# Patient Record
Sex: FEMALE | Race: BLACK OR AFRICAN AMERICAN | Employment: FULL TIME | URBAN - METROPOLITAN AREA
[De-identification: names, ages, dates, MRNs, and addresses within clinical notes are randomized per-mention and may not be internally consistent; named-entity substitution may affect disease eponyms.]

---

## 2017-02-28 ENCOUNTER — OFFICE VISIT (OUTPATIENT)
Dept: INTERNAL MEDICINE CLINIC | Age: 67
End: 2017-02-28

## 2017-02-28 VITALS
HEART RATE: 70 BPM | DIASTOLIC BLOOD PRESSURE: 81 MMHG | BODY MASS INDEX: 33.66 KG/M2 | HEIGHT: 63 IN | SYSTOLIC BLOOD PRESSURE: 138 MMHG | RESPIRATION RATE: 20 BRPM | WEIGHT: 190 LBS | TEMPERATURE: 97.8 F | OXYGEN SATURATION: 98 %

## 2017-02-28 DIAGNOSIS — I10 ESSENTIAL HYPERTENSION: ICD-10-CM

## 2017-02-28 DIAGNOSIS — E11.9 CONTROLLED TYPE 2 DIABETES MELLITUS WITHOUT COMPLICATION, WITHOUT LONG-TERM CURRENT USE OF INSULIN (HCC): ICD-10-CM

## 2017-02-28 DIAGNOSIS — Z00.00 MEDICARE ANNUAL WELLNESS VISIT, SUBSEQUENT: Primary | ICD-10-CM

## 2017-02-28 DIAGNOSIS — Z00.00 ROUTINE GENERAL MEDICAL EXAMINATION AT A HEALTH CARE FACILITY: ICD-10-CM

## 2017-02-28 DIAGNOSIS — E78.2 MIXED HYPERLIPIDEMIA: ICD-10-CM

## 2017-02-28 DIAGNOSIS — Z12.31 SCREENING MAMMOGRAM, ENCOUNTER FOR: ICD-10-CM

## 2017-02-28 DIAGNOSIS — Z13.39 SCREENING FOR ALCOHOLISM: ICD-10-CM

## 2017-02-28 NOTE — PROGRESS NOTES
This is a Subsequent Medicare Annual Wellness Visit providing Personalized Prevention Plan Services (PPPS) (Performed 12 months after initial AWV and PPPS )    I have reviewed the patient's medical history in detail and updated the computerized patient record. History     Past Medical History:   Diagnosis Date    Diabetes ([address]     Hypercholesterolemia     Hypertension       Past Surgical History:   Procedure Laterality Date    HX GYN      ectopic pregnancy x 2    HX GYN      ovarian cyst    HX TOTAL ABDOMINAL HYSTERECTOMY  1/1/1980    early 80's     Current Outpatient Prescriptions   Medication Sig Dispense Refill    sertraline (ZOLOFT) 25 mg tablet TAKE ONE TABLET BY MOUTH ONCE DAILY 30 Tab 5    metFORMIN (GLUCOPHAGE) 1,000 mg tablet Take 1 Tab by mouth two (2) times daily (with meals). Indications: type 2 diabetes mellitus 180 Tab 1    losartan (COZAAR) 50 mg tablet TAKE ONE TABLET BY MOUTH ONCE DAILY 30 Tab 5    hydroCHLOROthiazide (HYDRODIURIL) 25 mg tablet TAKE ONE TABLET BY MOUTH ONCE DAILY *NEEDS OFFICE VISIT FOR BLOOD PRESSURE CHECK* 30 Tab 5    atorvastatin (LIPITOR) 10 mg tablet TAKE ONE TABLET BY MOUTH ONCE DAILY 30 Tab 5    Blood-Glucose Meter monitoring kit Use as directed. 1 Kit 0    ondansetron hcl (ZOFRAN) 4 mg tablet       fluticasone (FLONASE) 50 mcg/actuation nasal spray 2 sprays by Both Nostrils route daily. 1 Bottle 5    mometasone (NASONEX) 50 mcg/actuation nasal spray 2 sprays by Both Nostrils route daily. 2 Container 0    calcium carbonate (TUMS) 200 mg calcium (500 mg) chew Take 1 Tab by mouth daily. Allergies   Allergen Reactions    Paxil [Paroxetine Hcl] Other (comments)     Made patient feel jittery.       Family History   Problem Relation Age of Onset    Diabetes Mother     Hypertension Mother     Cancer Brother     Cancer Other      Social History   Substance Use Topics    Smoking status: Former Smoker     Packs/day: 0.25     Quit date: 7/1/2014   [facility] Smokeless tobacco: Never Used    Alcohol use 0.5 oz/week     1 Cans of beer per week      Comment: once weekly     Patient Active Problem List   Diagnosis Code    GERD (gastroesophageal reflux disease) K21.9    Essential hypertension I10    Mixed hyperlipidemia E78.2    Diabetes mellitus type 2, controlled ([facility] [address] E11.9       Depression Risk Factor Screening:     PHQ 2 / 9, over the last two weeks 2/28/2017   Little interest or pleasure in doing things Not at all   Feeling down, depressed or hopeless Not at all   Total Score PHQ 2 0     Alcohol Risk Factor Screening: On any occasion during the past 3 months, have you had more than 3 drinks containing alcohol? No    Do you average more than 7 drinks per week? No      Functional Ability and Level of Safety:     Hearing Loss   normal    Activities of Daily Living   Self-care. Requires assistance with: no ADLs    Fall Risk     Fall Risk Assessment, last 12 mths 2/28/2017   Able to walk? Yes   Fall in past 12 months? No     Abuse Screen   Patient is not abused    Review of Systems   Pertinent items are noted in HPI. Physical Examination     Evaluation of Cognitive Function:  Mood/affect:  Happy most of the time  Appearance: age appropriate and well dressed  Family member/caregiver input: here alone today      Visit Vitals    /81    Pulse 70    Temp 97.8 °F (36.6 °C) (Oral)    Resp 20    Ht 5' 3\" (1.6 m)    Wt 190 lb (86.2 kg)    SpO2 98%    BMI 33.66 kg/m2     General:  Alert, cooperative, no distress, appears stated age. Head:  Normocephalic, without obvious abnormality, atraumatic. Eyes:  Conjunctivae/corneas clear. PERRL, EOMs intact. Ears:  Normal TMs and external ear canals both ears. Throat: Lips, mucosa, and tongue normal. Teeth and gums normal.   Neck: Supple, symmetrical, trachea midline, no adenopathy, thyroid: no enlargement/tenderness/nodules, no carotid bruit and no JVD. Lungs:   Clear to auscultation bilaterally. Chest wall:  No tenderness or deformity. Heart:  Regular rate and rhythm, S1, S2 normal, no murmur   Breast Exam:  No tenderness, masses, or nipple abnormality. Abdomen:   Soft, non-tender. Bowel sounds normal. No masses,  No organomegaly. Extremities: Extremities normal, atraumatic, no cyanosis or edema. Pulses: 2+ and symmetric all extremities. Skin: Skin color, texture, turgor normal. No rashes or lesions. Lymph nodes: Cervical, supraclavicular, and axillary nodes normal.   Neurologic:  Normal strength, sensation and reflexes throughout. Patient Care Team:  Sergio Santos PA-C as PCP - General    Advice/Referrals/Counseling   Education and counseling provided:  patient declined to get her flu shot. Assessment/Plan   Delia Kaur was seen today for physical.    Diagnoses and all orders for this visit:    Screening mammogram, encounter for  -     [facility] MAMMO BI SCREENING INCL CAD; Future    Medicare annual wellness visit, subsequent    Controlled type 2 diabetes mellitus without complication, without long-term current use of insulin ([facility] [address]  -     Cancel: METABOLIC PANEL, COMPREHENSIVE  -     Cancel: HEMOGLOBIN A1C WITH EAG  -     HEMOGLOBIN A1C WITH EAG  -     METABOLIC PANEL, COMPREHENSIVE    Essential hypertension  -     Cancel: METABOLIC PANEL, COMPREHENSIVE  -     METABOLIC PANEL, COMPREHENSIVE    Mixed hyperlipidemia  -     Cancel: METABOLIC PANEL, COMPREHENSIVE  -     Cancel: LIPID PANEL  -     METABOLIC PANEL, COMPREHENSIVE  -     LIPID PANEL    Routine general medical examination at a health care facility    Screening for alcoholism    . Patient states she will try to get back on track with her diet and exercise. Routine labs ordered today. I will contact her with the results once available.

## 2017-02-28 NOTE — PROGRESS NOTES
Reviewed record in preparation for visit and have obtained necessary documentation. Identified pt with two pt identifiers(name and ). Health Maintenance Due   Topic    Hepatitis C Screening     MEDICARE YEARLY EXAM     Pneumococcal 65+ Low/Medium Risk (2 of 2 - PCV13)    INFLUENZA AGE 9 TO ADULT     EYE EXAM RETINAL OR DILATED Q1     HEMOGLOBIN A1C Q6M     BREAST CANCER SCRN MAMMOGRAM          No chief complaint on file. Wt Readings from Last 3 Encounters:   17 190 lb (86.2 kg)   16 169 lb (76.7 kg)   02/15/16 166 lb (75.3 kg)     Temp Readings from Last 3 Encounters:   17 97.8 °F (36.6 °C) (Oral)   16 98.1 °F (36.7 °C) (Oral)   02/15/16 98.9 °F (37.2 °C) (Oral)     BP Readings from Last 3 Encounters:   16 127/65   02/15/16 135/72   16 133/71     Pulse Readings from Last 3 Encounters:   16 68   02/15/16 72   16 83           Learning Assessment:  :     Learning Assessment 2015   PRIMARY LEARNER Patient Patient Patient   HIGHEST LEVEL OF EDUCATION - PRIMARY LEARNER  - GRADUATED HIGH SCHOOL OR GED -   BARRIERS PRIMARY LEARNER - NONE -     - NONE -   PRIMARY LANGUAGE ENGLISH ENGLISH ENGLISH    NEED - No -   LEARNER PREFERENCE PRIMARY DEMONSTRATION DEMONSTRATION DEMONSTRATION   ANSWERED BY self Patient patient   RELATIONSHIP SELF SELF SELF       Depression Screening:  :     PHQ 2 / 9, over the last two weeks 2016   Little interest or pleasure in doing things Not at all   Feeling down, depressed or hopeless Not at all   Total Score PHQ 2 0       Fall Risk Assessment:  :     Fall Risk Assessment, last 12 mths 2016   Able to walk? Yes   Fall in past 12 months? No       Abuse Screening:  :     Abuse Screening Questionnaire 2015   Do you ever feel afraid of your partner? N N   Are you in a relationship with someone who physically or mentally threatens you? N N   Is it safe for you to go home? Y Y       Coordination of Care Questionnaire:  :     1) Have you been to an emergency room, urgent care clinic since your last visit? no   Hospitalized since your last visit? no             2) Have you seen or consulted any other health care providers outside of [facility] [place] since your last visit? no  (Include any pap smears or colon screenings in this section.)    3) Do you have an Advance Directive on file? no    4) Are you interested in receiving information on Advance Directives? YES      Patient is accompanied by self I have received verbal consent from [address] to discuss any/all medical information while they are present in the room.

## 2017-02-28 NOTE — PATIENT INSTRUCTIONS
[employer]Starcom Activation    Thank you for requesting access to [employer]. Please follow the instructions below to securely access and download your online medical record. [facility] allows you to send messages to your doctor, view your test results, renew your prescriptions, schedule appointments, and more. How Do I Sign Up? 1. In your internet browser, go to [email or web address]  2. Click on the First Time User? Click Here link in the Sign In box. You will be redirect to the New Member Sign Up page. 3. Enter your UTStarcom Access Code exactly as it appears below. You will not need to use this code after youve completed the sign-up process. If you do not sign up before the expiration date, you must request a new code. UTStarcom Access Code: [email or web address]  Expires: 2017  8:18 AM (This is the date your UTStarcom access code will )    4. Enter the last four digits of your Social Security Number (xxxx) and Date of Birth (mm/dd/yyyy) as indicated and click Submit. You will be taken to the next sign-up page. 5. Create a UTStarcom ID. This will be your UTStarcom login ID and cannot be changed, so think of one that is secure and easy to remember. 6. Create a UTStarcom password. You can change your password at any time. 7. Enter your Password Reset Question and Answer. This can be used at a later time if you forget your password. 8. Enter your e-mail address. You will receive e-mail notification when new information is available in [address] 9. Click Sign Up. You can now view and download portions of your medical record. 10. Click the Download Summary menu link to download a portable copy of your medical information. Additional Information    If you have questions, please visit the Frequently Asked Questions section of the UTStarcom website at [email or web address] Remember, UTStarcom is NOT to be used for urgent needs. For medical emergencies, dial 911.

## 2017-02-28 NOTE — MR AVS SNAPSHOT
Visit Information Date & Time Provider Department Dept. Phone Encounter #  
 2/28/2017  8:30 AM Delmis Santacruz PA-C [facility] [phone] [id number] Upcoming Health Maintenance Date Due Hepatitis C Screening 1950 MEDICARE YEARLY EXAM 12/9/2014 Pneumococcal 65+ Low/Medium Risk (2 of 2 - PCV13) 1/30/2016 EYE EXAM RETINAL OR DILATED Q1 12/17/2016 HEMOGLOBIN A1C Q6M 12/22/2016 BREAST CANCER SCRN MAMMOGRAM 3/12/2017 FOOT EXAM Q1 6/21/2017 MICROALBUMIN Q1 6/22/2017 LIPID PANEL Q1 6/22/2017 GLAUCOMA SCREENING Q2Y 12/17/2017 COLONOSCOPY 6/17/2019 DTaP/Tdap/Td series (2 - Td) 3/19/2025 Allergies as of 2/28/2017  Review Complete On: 6/21/2016 By: Delmis Santacruz PA-C Severity Noted Reaction Type Reactions Paxil [Paroxetine Hcl]  09/18/2014    Other (comments) Made patient feel jittery. Current Immunizations  Reviewed on 3/26/2015 Name Date Influenza High Dose Vaccine PF 1/22/2015  2:04 PM  
 Pneumococcal Polysaccharide (PPSV-23) 1/30/2015  3:24 PM  
 TB Skin Test (PPD) Intradermal 3/25/2015  8:00 AM  
 Tdap 3/19/2015 Zoster Vaccine, Live 3/19/2015 Not reviewed this visit You Were Diagnosed With   
  
 Codes Comments Screening mammogram, encounter for    -  Primary ICD-10-CM: Z12.31 
ICD-9-CM: V76.12 Medicare annual wellness visit, subsequent     ICD-10-CM: Z00.00 ICD-9-CM: V70.0 Controlled type 2 diabetes mellitus without complication, without long-term current use of insulin ([facility]     ICD-10-CM: E11.9 ICD-9-CM: 250.00 Essential hypertension     ICD-10-CM: I10 
ICD-9-CM: 401.9 Mixed hyperlipidemia     ICD-10-CM: E78.2 ICD-9-CM: 272.2 Vitals BP  
  
  
  
  
  
 138/81 Vitals History BMI and BSA Data Body Mass Index Body Surface Area  
 33.66 kg/m 2 1.96 m 2 Preferred Pharmacy Pharmacy Name Phone [facility] [address] [place] [address] [address] [place], [address] [phone] Your Updated Medication List  
  
   
This list is accurate as of: 2/28/17  8:53 AM.  Always use your most recent med list.  
  
  
  
  
 atorvastatin 10 mg tablet Commonly known as:  LIPITOR  
TAKE ONE TABLET BY MOUTH ONCE DAILY Blood-Glucose Meter monitoring kit Use as directed. calcium carbonate 200 mg calcium (500 mg) Chew Commonly known as:  TUMS Take 1 Tab by mouth daily. fluticasone 50 mcg/actuation nasal spray Commonly known as:  FLONASE  
2 sprays by Both Nostrils route daily. hydroCHLOROthiazide 25 mg tablet Commonly known as:  HYDRODIURIL  
TAKE ONE TABLET BY MOUTH ONCE DAILY *NEEDS OFFICE VISIT FOR BLOOD PRESSURE CHECK*  
  
 losartan 50 mg tablet Commonly known as:  COZAAR  
TAKE ONE TABLET BY MOUTH ONCE DAILY  
  
 metFORMIN 1,000 mg tablet Commonly known as:  GLUCOPHAGE Take 1 Tab by mouth two (2) times daily (with meals). Indications: type 2 diabetes mellitus  
  
 mometasone 50 mcg/actuation nasal spray Commonly known as:  NASONEX  
2 sprays by Both Nostrils route daily. ondansetron hcl 4 mg tablet Commonly known as:  ZOFRAN  
  
 sertraline 25 mg tablet Commonly known as:  ZOLOFT  
TAKE ONE TABLET BY MOUTH ONCE DAILY We Performed the Following HEMOGLOBIN A1C WITH EAG [44771 CPT(R)] LIPID PANEL [96961 CPT(R)] METABOLIC PANEL, COMPREHENSIVE [37754 CPT(R)] To-Do List   
 02/28/2017 Imaging:  [facility] MAMMO BI SCREENING INCL CAD Patient Instructions OnCirc Diagnosticshart Activation Thank you for requesting access to [employer]. Please follow the instructions below to securely access and download your online medical record. Over[employer] allows you to send messages to your doctor, view your test results, renew your prescriptions, schedule appointments, and more. How Do I Sign Up? 1. In your internet browser, go to [email or web address] 
2. Click on the First Time User? Click Here link in the Sign In box. You will be redirect to the New Member Sign Up page. 3. Enter your reQall Access Code exactly as it appears below. You will not need to use this code after youve completed the sign-up process. If you do not sign up before the expiration date, you must request a new code. reQall Access Code: [email or web address] Expires: 2017  8:18 AM (This is the date your reQall access code will ) 4. Enter the last four digits of your Social Security Number (xxxx) and Date of Birth (mm/dd/yyyy) as indicated and click Submit. You will be taken to the next sign-up page. 5. Create a reQall ID. This will be your reQall login ID and cannot be changed, so think of one that is secure and easy to remember. 6. Create a reQall password. You can change your password at any time. 7. Enter your Password Reset Question and Answer. This can be used at a later time if you forget your password. 8. Enter your e-mail address. You will receive e-mail notification when new information is available in [address] 9. Click Sign Up. You can now view and download portions of your medical record. 10. Click the Download Summary menu link to download a portable copy of your medical information. Additional Information If you have questions, please visit the Frequently Asked Questions section of the reQall website at [email or web address] Remember, reQall is NOT to be used for urgent needs. For medical emergencies, dial 911. Introducing [facility]! [facility] introduces reQall patient portal. Now you can access parts of your medical record, email your doctor's office, and request medication refills online. 1. In your internet browser, go to [email or web address] 2. Click on the First Time User? Click Here link in the Sign In box.  You will see the New Member Sign Up page. 3. Enter your GeaCom Access Code exactly as it appears below. You will not need to use this code after youve completed the sign-up process. If you do not sign up before the expiration date, you must request a new code. · GeaCom Access Code: [email or web address] Expires: 5/29/2017  8:18 AM 
 
4. Enter the last four digits of your Social Security Number (xxxx) and Date of Birth (mm/dd/yyyy) as indicated and click Submit. You will be taken to the next sign-up page. 5. Create a GeaCom ID. This will be your GeaCom login ID and cannot be changed, so think of one that is secure and easy to remember. 6. Create a GeaCom password. You can change your password at any time. 7. Enter your Password Reset Question and Answer. This can be used at a later time if you forget your password. 8. Enter your e-mail address. You will receive e-mail notification when new information is available in [address] 9. Click Sign Up. You can now view and download portions of your medical record. 10. Click the Download Summary menu link to download a portable copy of your medical information. If you have questions, please visit the Frequently Asked Questions section of the GeaCom website. Remember, GeaCom is NOT to be used for urgent needs. For medical emergencies, dial 911. Now available from your iPhone and Android! Please provide this summary of care documentation to your next provider. Your primary care clinician is listed as Molly Ortiz. If you have any questions after today's visit, please call [phone].

## 2017-03-01 LAB
CREATININE, EXTERNAL: 0.68
HBA1C MFR BLD HPLC: 6.8 %
LDL-C, EXTERNAL: 100

## 2017-03-03 ENCOUNTER — TELEPHONE (OUTPATIENT)
Dept: INTERNAL MEDICINE CLINIC | Age: 67
End: 2017-03-03

## 2017-03-03 NOTE — TELEPHONE ENCOUNTER
Please let the patient know her hemoglobin A1c is 6.8 up from 6.4. cholesterol looks good kidney and liver function all good.  thanks

## 2017-03-03 NOTE — TELEPHONE ENCOUNTER
Writer contacted patient to inform of lab results per Gunnar Burch, patient verbalized understanding.

## 2017-04-04 ENCOUNTER — HOSPITAL ENCOUNTER (OUTPATIENT)
Dept: MAMMOGRAPHY | Age: 67
Discharge: HOME | End: 2017-04-04
Attending: PHYSICIAN ASSISTANT
Payer: MEDICARE

## 2017-04-04 ENCOUNTER — TELEPHONE (OUTPATIENT)
Dept: INTERNAL MEDICINE CLINIC | Age: 67
End: 2017-04-04

## 2017-04-04 DIAGNOSIS — Z12.31 SCREENING MAMMOGRAM, ENCOUNTER FOR: ICD-10-CM

## 2017-04-04 DIAGNOSIS — R92.8 ABNORMAL MAMMOGRAM: Primary | ICD-10-CM

## 2017-04-04 PROCEDURE — 77067 SCR MAMMO BI INCL CAD: CPT

## 2017-04-05 NOTE — PROGRESS NOTES
Patient has some asymmetry noted with calcifications. Will need additional views and ultrasound. These orders have been placed.

## 2017-04-05 NOTE — PROGRESS NOTES
Writer informed patient of results per Kandy Jovel, will get back to patient in regards to additional testing.

## 2017-04-05 NOTE — TELEPHONE ENCOUNTER
I received her mammogram results and it said she would need additional views and possible ultrasound. I placed the orders because I am off today. The imaging center should contact her about this and wanted to make sure the order was already done.

## 2017-04-05 NOTE — TELEPHONE ENCOUNTER
Writer contacted patient to give mammogram results and new orders per Eliseo Emerson, patient was confused about additional mammogram order, will forward to Eliseo Emerson for advise.

## 2017-04-06 NOTE — TELEPHONE ENCOUNTER
Writer contacted patient to inform of further testing needed per Haylee Bowman, patient was a little upset and her appointment wasn't until 4/17, Irina Jackson called and got another appointment for April 7, 2917, patient somewhat relieved.

## 2017-04-07 ENCOUNTER — HOSPITAL ENCOUNTER (OUTPATIENT)
Dept: MAMMOGRAPHY | Age: 67
Discharge: HOME | End: 2017-04-07
Attending: PHYSICIAN ASSISTANT
Payer: MEDICARE

## 2017-04-07 ENCOUNTER — HOSPITAL ENCOUNTER (OUTPATIENT)
Dept: ULTRASOUND IMAGING | Age: 67
Discharge: HOME | End: 2017-04-07
Attending: PHYSICIAN ASSISTANT
Payer: MEDICARE

## 2017-04-07 DIAGNOSIS — R92.8 ABNORMAL MAMMOGRAM: ICD-10-CM

## 2017-04-07 PROCEDURE — 77065 DX MAMMO INCL CAD UNI: CPT

## 2017-04-07 NOTE — PROGRESS NOTES
Confirmed calcifications noted but no definite change.  Follow up in 6 months for unilateral right breast mammogram

## 2017-04-24 ENCOUNTER — TELEPHONE (OUTPATIENT)
Dept: INTERNAL MEDICINE CLINIC | Age: 67
End: 2017-04-24

## 2017-04-24 NOTE — TELEPHONE ENCOUNTER
Writer contacted patient to remind for her to get her labs done, patient stated she has been so busy, she did forget and writer reprinted and sent to address on file for patient per her request.

## 2017-05-05 LAB
CREATININE, EXTERNAL: 0.62
HBA1C MFR BLD HPLC: 6.8 %
LDL-C, EXTERNAL: 91

## 2017-05-12 ENCOUNTER — TELEPHONE (OUTPATIENT)
Dept: INTERNAL MEDICINE CLINIC | Age: 67
End: 2017-05-12

## 2017-05-12 NOTE — TELEPHONE ENCOUNTER
Writer spoke with patient and informed her Per Bee Sears that labs looks good and cholesterol is good and diabetes is controlled. Patient is very pleased.

## 2017-05-12 NOTE — TELEPHONE ENCOUNTER
Please let patient know her labs look good.  (cholestrol is good and diabetes is controlled.) thanks

## 2017-05-26 DIAGNOSIS — E78.5 HYPERLIPIDEMIA, UNSPECIFIED HYPERLIPIDEMIA TYPE: ICD-10-CM

## 2017-05-26 DIAGNOSIS — I10 ESSENTIAL HYPERTENSION: ICD-10-CM

## 2017-05-26 RX ORDER — ATORVASTATIN CALCIUM 10 MG/1
TABLET, FILM COATED ORAL
Qty: 30 TAB | Refills: 0 | Status: SHIPPED | OUTPATIENT
Start: 2017-05-26 | End: 2017-06-23 | Stop reason: SDUPTHER

## 2017-05-26 RX ORDER — LOSARTAN POTASSIUM 50 MG/1
TABLET ORAL
Qty: 30 TAB | Refills: 0 | Status: SHIPPED | OUTPATIENT
Start: 2017-05-26 | End: 2017-06-23 | Stop reason: SDUPTHER

## 2017-05-26 RX ORDER — HYDROCHLOROTHIAZIDE 25 MG/1
TABLET ORAL
Qty: 30 TAB | Refills: 0 | Status: SHIPPED | OUTPATIENT
Start: 2017-05-26 | End: 2017-06-23 | Stop reason: SDUPTHER

## 2017-05-30 ENCOUNTER — OFFICE VISIT (OUTPATIENT)
Dept: INTERNAL MEDICINE CLINIC | Age: 67
End: 2017-05-30

## 2017-05-30 VITALS
WEIGHT: 193 LBS | HEART RATE: 55 BPM | DIASTOLIC BLOOD PRESSURE: 67 MMHG | SYSTOLIC BLOOD PRESSURE: 142 MMHG | HEIGHT: 63 IN | TEMPERATURE: 98 F | BODY MASS INDEX: 34.2 KG/M2 | OXYGEN SATURATION: 98 % | RESPIRATION RATE: 16 BRPM

## 2017-05-30 DIAGNOSIS — E08.9 DIABETES MELLITUS DUE TO UNDERLYING CONDITION, CONTROLLED, WITHOUT COMPLICATION, WITHOUT LONG-TERM CURRENT USE OF INSULIN (HCC): ICD-10-CM

## 2017-05-30 DIAGNOSIS — M25.551 RIGHT HIP PAIN: ICD-10-CM

## 2017-05-30 DIAGNOSIS — E78.2 MIXED HYPERLIPIDEMIA: Primary | ICD-10-CM

## 2017-05-30 DIAGNOSIS — Z11.59 NEED FOR HEPATITIS C SCREENING TEST: ICD-10-CM

## 2017-05-30 DIAGNOSIS — I10 ESSENTIAL HYPERTENSION: ICD-10-CM

## 2017-05-30 NOTE — PROGRESS NOTES
HISTORY OF PRESENT ILLNESS  Mague Ray is a 78 y.o. female. HPI  Patient presents to the office for follow up visit. She states she had been really focused on eating better and exercising. She states three weeks ago she \"fell off the wagon\". She has been eating salt and vinegar chips and chocolate fudge ice cream. She has been walking about 2 miles with her daughtr in law each day. She has been trying to eat 6 small meals a day. She states she has noticed since walking that she has some intermittent right hip pain and some back stiffness. She has been taking one tylenol pm if needed. Also she reports she has noticed that behind her right ear she has a bump. She reports the bump is itchy and seems to itch more when her hair touches it when sweating. She admits to messing with the area. She denies pain. Review of Systems   Musculoskeletal: Positive for joint pain. Intermittent right hip and back pain. Skin:        \"bump behind the right ear. Blood pressure 142/67, pulse (!) 55, temperature 98 °F (36.7 °C), temperature source Oral, resp. rate 16, height 5' 3\" (1.6 m), weight 193 lb (87.5 kg), SpO2 98 %. Physical Exam   Constitutional: She appears well-developed and well-nourished. Cardiovascular: Normal rate and regular rhythm. No murmur heard. Pulmonary/Chest: Effort normal and breath sounds normal.   Skin:   Right ear- posteriorly of the ear slightly raised papule. No signs of infection. Not tender to palpation.        ASSESSMENT and PLAN  Diagnoses and all orders for this visit:    Mixed hyperlipidemia  -     METABOLIC PANEL, COMPREHENSIVE  -     LIPID PANEL    Essential hypertension  -     METABOLIC PANEL, COMPREHENSIVE    Right hip pain    Diabetes mellitus due to underlying condition, controlled, without complication, without long-term current use of insulin (HCC)  -     MICROALBUMIN, UR, RAND W/ MICROALBUMIN/CREA RATIO  -     HEMOGLOBIN A1C WITH EAG    Need for hepatitis C screening test  -     HEPATITIS C AB    patient is not due labs until August. Advised her to start back with her regular exercising and watching her diet. Told her not to but the chips if she feels she can not eat them in moderation. Trial of hydrocortisone cream behind the ear. I gave her hip exercises/stretches to see if this will help the intermittent hip pain. I told her to follow up if not better.

## 2017-05-30 NOTE — MR AVS SNAPSHOT
Visit Information Date & Time Provider Department Dept. Phone Encounter #  
 5/30/2017  8:30 AM Amee Painting PA-C [facility] [phone] [id number] Upcoming Health Maintenance Date Due Hepatitis C Screening 1950 EYE EXAM RETINAL OR DILATED Q1 12/17/2016 MICROALBUMIN Q1 6/22/2017 INFLUENZA AGE 9 TO ADULT 8/1/2017 HEMOGLOBIN A1C Q6M 9/1/2017 GLAUCOMA SCREENING Q2Y 12/17/2017 MEDICARE YEARLY EXAM 3/1/2018 LIPID PANEL Q1 3/1/2018 FOOT EXAM Q1 5/30/2018 BREAST CANCER SCRN MAMMOGRAM 4/7/2019 COLONOSCOPY 6/17/2019 DTaP/Tdap/Td series (2 - Td) 3/19/2025 Allergies as of 5/30/2017  Review Complete On: 4/7/2017 By: Yani Porter Severity Noted Reaction Type Reactions Paxil [Paroxetine Hcl]  09/18/2014    Other (comments) Made patient feel jittery. Current Immunizations  Reviewed on 3/26/2015 Name Date Influenza High Dose Vaccine PF 1/22/2015  2:04 PM  
 Pneumococcal Polysaccharide (PPSV-23) 1/30/2015  3:24 PM  
 TB Skin Test (PPD) Intradermal 3/25/2015  8:00 AM  
 Tdap 3/19/2015 Zoster Vaccine, Live 3/19/2015 Not reviewed this visit You Were Diagnosed With   
  
 Codes Comments Mixed hyperlipidemia    -  Primary ICD-10-CM: E26.5 ICD-9-CM: 272.2 Essential hypertension     ICD-10-CM: I10 
ICD-9-CM: 401.9 Right hip pain     ICD-10-CM: M25.551 ICD-9-CM: 719.45 Diabetes mellitus due to underlying condition, controlled, without complication, without long-term current use of insulin ([facility]     ICD-10-CM: E08.9 ICD-9-CM: 249.00 Need for hepatitis C screening test     ICD-10-CM: Z11.59 
ICD-9-CM: V73.89 Vitals OB Status Smoking Status Hysterectomy Former Smoker Preferred Pharmacy Pharmacy Name Phone [facility] [address] [place] [address] Dr Brown, [address] [place] [address] Avenue [phone] Your Updated Medication List  
  
   
 This list is accurate as of: 5/30/17  9:03 AM.  Always use your most recent med list.  
  
  
  
  
 * atorvastatin 10 mg tablet Commonly known as:  LIPITOR  
TAKE ONE TABLET BY MOUTH ONCE DAILY  
  
 * atorvastatin 10 mg tablet Commonly known as:  LIPITOR  
TAKE ONE TABLET BY MOUTH ONCE DAILY Blood-Glucose Meter monitoring kit Use as directed. calcium carbonate 200 mg calcium (500 mg) Chew Commonly known as:  TUMS Take 1 Tab by mouth daily. fluticasone 50 mcg/actuation nasal spray Commonly known as:  FLONASE  
2 sprays by Both Nostrils route daily. * hydroCHLOROthiazide 25 mg tablet Commonly known as:  HYDRODIURIL  
TAKE ONE TABLET BY MOUTH ONCE DAILY *NEEDS OFFICE VISIT FOR BLOOD PRESSURE CHECK*  
  
 * hydroCHLOROthiazide 25 mg tablet Commonly known as:  HYDRODIURIL  
TAKE ONE TABLET BY MOUTH ONCE DAILY  
  
 * losartan 50 mg tablet Commonly known as:  COZAAR  
TAKE ONE TABLET BY MOUTH ONCE DAILY  
  
 * losartan 50 mg tablet Commonly known as:  COZAAR  
TAKE ONE TABLET BY MOUTH ONCE DAILY  
  
 metFORMIN 1,000 mg tablet Commonly known as:  GLUCOPHAGE Take 1 Tab by mouth two (2) times daily (with meals). Indications: type 2 diabetes mellitus  
  
 mometasone 50 mcg/actuation nasal spray Commonly known as:  NASONEX  
2 sprays by Both Nostrils route daily. ondansetron hcl 4 mg tablet Commonly known as:  ZOFRAN  
  
 * sertraline 25 mg tablet Commonly known as:  ZOLOFT  
TAKE ONE TABLET BY MOUTH ONCE DAILY * sertraline 25 mg tablet Commonly known as:  ZOLOFT  
TAKE ONE TABLET BY MOUTH ONCE DAILY * Notice: This list has 8 medication(s) that are the same as other medications prescribed for you. Read the directions carefully, and ask your doctor or other care provider to review them with you. We Performed the Following HEMOGLOBIN A1C WITH EAG [87480 CPT(R)] HEPATITIS C AB [66602 CPT(R)] LIPID PANEL [49548 CPT(R)] METABOLIC PANEL, COMPREHENSIVE [96119 CPT(R)] MICROALBUMIN, UR, RAND W/ MICROALBUMIN/CREA RATIO [id number] CPT(R)] Introducing [facility]! [facility] introduces [email or web address] patient portal. Now you can access parts of your medical record, email your doctor's office, and request medication refills online. 1. In your internet browser, go to [email or web address] 2. Click on the First Time User? Click Here link in the Sign In box. You will see the New Member Sign Up page. 3. Enter your [email or web address] Access Code exactly as it appears below. You will not need to use this code after youve completed the sign-up process. If you do not sign up before the expiration date, you must request a new code. · [email or web address] Access Code: [email or web address] Expires: 8/28/2017  9:02 AM 
 
4. Enter the last four digits of your Social Security Number (xxxx) and Date of Birth (mm/dd/yyyy) as indicated and click Submit. You will be taken to the next sign-up page. 5. Create a [email or web address] ID. This will be your [email or web address] login ID and cannot be changed, so think of one that is secure and easy to remember. 6. Create a [email or web address] password. You can change your password at any time. 7. Enter your Password Reset Question and Answer. This can be used at a later time if you forget your password. 8. Enter your e-mail address. You will receive e-mail notification when new information is available in [address]. 9. Click Sign Up. You can now view and download portions of your medical record. 10. Click the Download Summary menu link to download a portable copy of your medical information. If you have questions, please visit the Frequently Asked Questions section of the [email or web address] website. Remember, [email or web address] is NOT to be used for urgent needs. For medical emergencies, dial 911. Now available from your iPhone and Android! Please provide this summary of care documentation to your next provider. Your primary care clinician is listed as Molly Ortiz. If you have any questions after today's visit, please call [phone].

## 2017-06-23 DIAGNOSIS — E78.5 HYPERLIPIDEMIA, UNSPECIFIED HYPERLIPIDEMIA TYPE: ICD-10-CM

## 2017-06-23 DIAGNOSIS — I10 ESSENTIAL HYPERTENSION: ICD-10-CM

## 2017-06-23 RX ORDER — ATORVASTATIN CALCIUM 10 MG/1
TABLET, FILM COATED ORAL
Qty: 30 TAB | Refills: 0 | Status: SHIPPED | OUTPATIENT
Start: 2017-06-23 | End: 2017-07-26 | Stop reason: SDUPTHER

## 2017-06-23 RX ORDER — HYDROCHLOROTHIAZIDE 25 MG/1
TABLET ORAL
Qty: 30 TAB | Refills: 0 | Status: SHIPPED | OUTPATIENT
Start: 2017-06-23 | End: 2017-07-26 | Stop reason: SDUPTHER

## 2017-06-23 RX ORDER — LOSARTAN POTASSIUM 50 MG/1
TABLET ORAL
Qty: 30 TAB | Refills: 0 | Status: SHIPPED | OUTPATIENT
Start: 2017-06-23 | End: 2017-07-26 | Stop reason: SDUPTHER

## 2017-10-05 ENCOUNTER — HOSPITAL ENCOUNTER (OUTPATIENT)
Dept: MAMMOGRAPHY | Age: 67
Discharge: HOME | End: 2017-10-05
Attending: PHYSICIAN ASSISTANT
Payer: MEDICARE

## 2017-10-05 DIAGNOSIS — R92.8 ABNORMAL MAMMOGRAM: ICD-10-CM

## 2017-10-05 PROCEDURE — 77065 DX MAMMO INCL CAD UNI: CPT

## 2017-10-05 NOTE — PROGRESS NOTES
Benign stable findings of right breast. She should follow up in 6 months for regular scheduled mammogram

## 2017-10-24 RX ORDER — SERTRALINE HYDROCHLORIDE 25 MG/1
TABLET, FILM COATED ORAL
Qty: 30 TAB | Refills: 5 | Status: SHIPPED | OUTPATIENT
Start: 2017-10-24 | End: 2019-09-19 | Stop reason: SDUPTHER

## 2018-01-24 DIAGNOSIS — I10 ESSENTIAL HYPERTENSION: ICD-10-CM

## 2018-01-24 DIAGNOSIS — E78.5 HYPERLIPIDEMIA, UNSPECIFIED HYPERLIPIDEMIA TYPE: ICD-10-CM

## 2018-01-25 RX ORDER — ATORVASTATIN CALCIUM 10 MG/1
TABLET, FILM COATED ORAL
Qty: 30 TAB | Refills: 5 | Status: SHIPPED | OUTPATIENT
Start: 2018-01-25 | End: 2018-03-08 | Stop reason: SDUPTHER

## 2018-01-25 RX ORDER — HYDROCHLOROTHIAZIDE 25 MG/1
TABLET ORAL
Qty: 30 TAB | Refills: 5 | Status: SHIPPED | OUTPATIENT
Start: 2018-01-25 | End: 2018-03-08 | Stop reason: SDUPTHER

## 2018-01-25 RX ORDER — LOSARTAN POTASSIUM 50 MG/1
TABLET ORAL
Qty: 30 TAB | Refills: 5 | Status: SHIPPED | OUTPATIENT
Start: 2018-01-25 | End: 2018-03-08 | Stop reason: SDUPTHER

## 2018-01-29 RX ORDER — SERTRALINE HYDROCHLORIDE 25 MG/1
TABLET, FILM COATED ORAL
Qty: 30 TAB | Refills: 5 | Status: SHIPPED | OUTPATIENT
Start: 2018-01-29 | End: 2018-03-08 | Stop reason: SDUPTHER

## 2018-03-08 ENCOUNTER — OFFICE VISIT (OUTPATIENT)
Dept: INTERNAL MEDICINE CLINIC | Age: 68
End: 2018-03-08

## 2018-03-08 VITALS
SYSTOLIC BLOOD PRESSURE: 152 MMHG | OXYGEN SATURATION: 97 % | BODY MASS INDEX: 34.91 KG/M2 | DIASTOLIC BLOOD PRESSURE: 79 MMHG | HEIGHT: 63 IN | WEIGHT: 197 LBS | TEMPERATURE: 97.7 F | RESPIRATION RATE: 20 BRPM | HEART RATE: 64 BPM

## 2018-03-08 DIAGNOSIS — Z11.59 NEED FOR HEPATITIS C SCREENING TEST: ICD-10-CM

## 2018-03-08 DIAGNOSIS — I10 ESSENTIAL HYPERTENSION: ICD-10-CM

## 2018-03-08 DIAGNOSIS — E11.9 CONTROLLED TYPE 2 DIABETES MELLITUS WITHOUT COMPLICATION, WITHOUT LONG-TERM CURRENT USE OF INSULIN (HCC): ICD-10-CM

## 2018-03-08 DIAGNOSIS — J35.8 TONSIL STONE: ICD-10-CM

## 2018-03-08 DIAGNOSIS — E78.2 MIXED HYPERLIPIDEMIA: Primary | ICD-10-CM

## 2018-03-08 DIAGNOSIS — K21.9 GASTROESOPHAGEAL REFLUX DISEASE WITHOUT ESOPHAGITIS: ICD-10-CM

## 2018-03-08 NOTE — PROGRESS NOTES
Reviewed record in preparation for visit and have obtained necessary documentation. Identified pt with two pt identifiers(name and ). Health Maintenance Due   Topic    Hepatitis C Screening     EYE EXAM RETINAL OR DILATED Q1     MICROALBUMIN Q1     Influenza Age 5 to Adult     HEMOGLOBIN A1C Q6M     GLAUCOMA SCREENING Q2Y          No chief complaint on file. Wt Readings from Last 3 Encounters:   18 197 lb (89.4 kg)   17 193 lb (87.5 kg)   17 190 lb (86.2 kg)     Temp Readings from Last 3 Encounters:   18 97.7 °F (36.5 °C) (Oral)   17 98 °F (36.7 °C) (Oral)   17 97.8 °F (36.6 °C) (Oral)     BP Readings from Last 3 Encounters:   17 142/67   17 138/81   16 127/65     Pulse Readings from Last 3 Encounters:   17 (!) 55   17 70   16 68           Learning Assessment:  :     Learning Assessment 3/8/2018 2016 2015 2014   PRIMARY LEARNER Patient Patient Patient Patient   HIGHEST LEVEL OF EDUCATION - PRIMARY LEARNER  - - GRADUATED HIGH SCHOOL OR GED -   BARRIERS PRIMARY LEARNER - - NONE -     - - NONE -   PRIMARY LANGUAGE ENGLISH ENGLISH ENGLISH ENGLISH    NEED - - No -   LEARNER PREFERENCE PRIMARY DEMONSTRATION DEMONSTRATION DEMONSTRATION DEMONSTRATION   ANSWERED BY patient self Patient patient   RELATIONSHIP SELF SELF SELF SELF       Depression Screening:  :     PHQ over the last two weeks 2017   Little interest or pleasure in doing things Not at all   Feeling down, depressed or hopeless Not at all   Total Score PHQ 2 0       Fall Risk Assessment:  :     Fall Risk Assessment, last 12 mths 2017   Able to walk? Yes   Fall in past 12 months? No       Abuse Screening:  :     Abuse Screening Questionnaire 3/8/2018 2016 2015   Do you ever feel afraid of your partner? N N N   Are you in a relationship with someone who physically or mentally threatens you?  N N N   Is it safe for you to go home? Y Y Y       Coordination of Care Questionnaire:  :     1) Have you been to an emergency room, urgent care clinic since your last visit? no   Hospitalized since your last visit? no             2) Have you seen or consulted any other health care providers outside of [address] [place] [address] since your last visit? no  (Include any pap smears or colon screenings in this section.)    3) Do you have an Advance Directive on file? no    4) Are you interested in receiving information on Advance Directives? YES      Patient is accompanied by self I have received [facility] [id number] to discuss any/all medical information while they are present in the room.

## 2018-03-08 NOTE — PROGRESS NOTES
Subjective:     Salvador Mederos is a 75 y.o. female who presents for follow up of diabetes, hypertension and hyperlipidemia. Diet and Lifestyle: patient states she has not been behaving. she has been eating a lot of the wrong foods and she is not exercising. Home BP Monitoring: is not measured at home    Cardiovascular ROS: taking medications as instructed, no medication side effects noted, no TIA's, no chest pain on exertion, no dyspnea on exertion, no swelling of ankles. New concerns: need labs done. Patient Active Problem List    Diagnosis Date Noted    Essential hypertension 06/21/2016    Mixed hyperlipidemia 06/21/2016    Diabetes mellitus type [facility], [facility] 06/21/2016    GERD (gastroesophageal reflux disease) 02/25/2014     Allergies   Allergen Reactions    Paxil [Paroxetine Hcl] Other (comments)     Made patient feel jittery. Review of Systems, additional:  Pertinent items are noted in HPI. Objective:     Visit Vitals    /79    Pulse 64    Temp 97.7 °F (36.5 °C) (Oral)    Resp 20    Ht 5' 3\" (1.6 m)    Wt 197 lb (89.4 kg)    SpO2 97%    BMI 34.9 kg/m2     Appearance: alert, well appearing, and in no distress and overweight. General exam: CVS exam BP noted to be mildly elevated today in office, S1, S2 normal, no gallop, no murmur, chest clear, no edema. Mouth- large tonsil stone noted of the left tonsil  Lab review: orders written for new lab studies as appropriate; see orders. Assessment/Plan:     diabetes control uncertain, hypertension borderline controlled. current treatment plan is effective, no change in therapy  i will contact her with the results once back. Diagnoses and all orders for this visit:    1. Mixed hyperlipidemia  -     CBC WITH AUTOMATED DIFF  -     METABOLIC PANEL, COMPREHENSIVE  -     LIPID PANEL    2.  Essential hypertension  -     CBC WITH AUTOMATED DIFF  -     METABOLIC PANEL, COMPREHENSIVE  -     MICROALBUMIN, UR, RAND W/ MICROALB/CREAT RATIO    3. Gastroesophageal reflux disease without esophagitis  -     CBC WITH AUTOMATED DIFF  -     METABOLIC PANEL, COMPREHENSIVE    4. Controlled type 2 diabetes mellitus without complication, without long-term current use of insulin (HCC)  -     CBC WITH AUTOMATED DIFF  -     METABOLIC PANEL, COMPREHENSIVE  -     HEMOGLOBIN A1C WITH EAG  -     MICROALBUMIN, UR, RAND W/ MICROALB/CREAT RATIO    5. Tonsil stone  -     REFERRAL TO ENT-OTOLARYNGOLOGY    6. Need for hepatitis C screening test  -     HEPATITIS C AB    we talked about getting back track with diet and exercise. The patient understands that extra weight can effect her blood pressure. I will contact her with the results on the labs once back. All this discussed with the patient and she understands and agrees. Referral to have tonsil stone removed by ENT.

## 2018-03-08 NOTE — MR AVS SNAPSHOT
[address] [place] [address] [address] [place] [address] 
[phone] Patient: Greg Ambriz MRN: [id number] Visit Information Date & Time Provider Department Dept. Phone Encounter #  
 3/8/2018 10:30 AM Concha Bryan [facility] [phone] [id number] Upcoming Health Maintenance Date Due Hepatitis C Screening 1950 EYE EXAM RETINAL OR DILATED Q1 2016 MICROALBUMIN Q1 2017 HEMOGLOBIN A1C Q6M 2017 GLAUCOMA SCREENING Q2Y 2017 LIPID PANEL Q1 2018 FOOT EXAM Q1 2018 COLONOSCOPY 2019 BREAST CANCER SCRN MAMMOGRAM 10/5/2019 DTaP/Tdap/Td series (2 - Td) 3/19/2025 Allergies as of 3/8/2018  In Progress On: 3/8/2018 By: Sim Rolle LPN Severity Noted Reaction Type Reactions Paxil [Paroxetine Hcl]  2014    Other (comments) Made patient feel jittery. Current Immunizations  Reviewed on 3/26/2015 Name Date Influenza High Dose Vaccine PF 2015  2:04 PM  
 Pneumococcal Polysaccharide (PPSV-23) 2015  3:24 PM  
 TB Skin Test (PPD) Intradermal 3/25/2015  8:00 AM  
 Tdap 3/19/2015 Zoster Vaccine, Live 3/19/2015 Not reviewed this visit You Were Diagnosed With   
  
 Codes Comments Mixed hyperlipidemia    -  Primary ICD-10-CM: R85.8 ICD-9-CM: 272.2 Essential hypertension     ICD-10-CM: I10 
ICD-9-CM: 401.9 Gastroesophageal reflux disease without esophagitis     ICD-10-CM: K21.9 ICD-9-CM: 530.81 Controlled type 2 diabetes mellitus without complication, without long-term current use of insulin ([facility]     ICD-10-CM: E11.9 ICD-9-CM: 250.00 Tonsil stone     ICD-10-CM: J35.8 ICD-9-CM: 474.8 Need for hepatitis C screening test     ICD-10-CM: Z11.59 
ICD-9-CM: V73.89 Vitals BP Pulse Temp Resp Height(growth percentile) Weight(growth percentile) 152/79 64 97.7 °F (36.5 °C) (Oral) 20 5' 3\" (1.6 m) 197 lb (89.4 kg) SpO2 BMI OB Status Smoking Status 97% 34.9 kg/m2 Hysterectomy Former Smoker Vitals History BMI and BSA Data Body Mass Index Body Surface Area 34.9 kg/m 2 1.99 m 2 Preferred Pharmacy Pharmacy Name Phone [facility] [address], [address] [phone] Your Updated Medication List  
  
   
This list is accurate as of 3/8/18 11:04 AM.  Always use your most recent med list.  
  
  
  
  
 atorvastatin 10 mg tablet Commonly known as:  LIPITOR  
TAKE ONE TABLET BY MOUTH ONCE DAILY Blood-Glucose Meter monitoring kit Use as directed. calcium carbonate 200 mg calcium (500 mg) Chew Commonly known as:  TUMS Take 1 Tab by mouth daily. hydroCHLOROthiazide 25 mg tablet Commonly known as:  HYDRODIURIL  
TAKE ONE TABLET BY MOUTH ONCE DAILY *NEEDS OFFICE VISIT FOR BLOOD PRESSURE CHECK*  
  
 losartan 50 mg tablet Commonly known as:  COZAAR  
TAKE ONE TABLET BY MOUTH ONCE DAILY  
  
 metFORMIN 1,000 mg tablet Commonly known as:  GLUCOPHAGE Take 1 Tab by mouth two (2) times daily (with meals). Indications: type 2 diabetes mellitus  
  
 ondansetron hcl 4 mg tablet Commonly known as:  ZOFRAN  
  
 sertraline 25 mg tablet Commonly known as:  ZOLOFT  
TAKE ONE TABLET BY MOUTH ONCE DAILY We Performed the Following CBC WITH AUTOMATED DIFF [25573 CPT(R)] HEMOGLOBIN A1C WITH EAG [76086 CPT(R)] HEPATITIS C AB [31841 CPT(R)] LIPID PANEL [77137 CPT(R)] METABOLIC PANEL, COMPREHENSIVE [54635 CPT(R)] MICROALBUMIN, UR, RAND W/ MICROALB/CREAT RATIO [id number] CPT(R)] REFERRAL TO ENT-OTOLARYNGOLOGY [RCA57 Custom] Referral Information Referral ID Referred By Referred To  
  
 [id number] MD Lavonne Hess [facility], [address] [place] [address] Phone: [phone] Fax: [phone] Visits Status Start Date End Date 1 New Request 3/8/18 3/8/19 If your referral has a status of pending review or denied, additional information will be sent to support the outcome of this decision. Please provide this summary of care documentation to your next provider. Your primary care clinician is listed as Molly Ortiz. If you have any questions after today's visit, please call [phone].

## 2018-03-14 DIAGNOSIS — E78.5 HYPERLIPIDEMIA, UNSPECIFIED HYPERLIPIDEMIA TYPE: ICD-10-CM

## 2018-03-14 DIAGNOSIS — I10 ESSENTIAL HYPERTENSION: ICD-10-CM

## 2018-03-15 ENCOUNTER — TELEPHONE (OUTPATIENT)
Dept: INTERNAL MEDICINE CLINIC | Age: 68
End: 2018-03-15

## 2018-03-15 RX ORDER — LOSARTAN POTASSIUM 50 MG/1
TABLET ORAL
Qty: 30 TAB | Refills: 5 | Status: SHIPPED | OUTPATIENT
Start: 2018-03-15 | End: 2018-05-15 | Stop reason: SDUPTHER

## 2018-03-15 RX ORDER — ATORVASTATIN CALCIUM 10 MG/1
TABLET, FILM COATED ORAL
Qty: 30 TAB | Refills: 5 | Status: SHIPPED | OUTPATIENT
Start: 2018-03-15 | End: 2018-05-15 | Stop reason: SDUPTHER

## 2018-03-15 NOTE — TELEPHONE ENCOUNTER
Please let the patient know her diabetes is no controlled. Go back on her metformin like we discussed. She knew her labs were going to be out of range due to not eating the correct things and not exercising. We will do labs again in 3-4 months. (hemoglobin A1c is 8.1, the last time she was 6.8) all other labs were okay.  thanks

## 2018-04-10 ENCOUNTER — HOSPITAL ENCOUNTER (OUTPATIENT)
Dept: MAMMOGRAPHY | Age: 68
Discharge: HOME | End: 2018-04-10
Attending: PHYSICIAN ASSISTANT
Payer: MEDICARE

## 2018-04-10 DIAGNOSIS — R92.8 ABNORMAL MAMMOGRAM OF RIGHT BREAST: ICD-10-CM

## 2018-04-10 PROCEDURE — 77066 DX MAMMO INCL CAD BI: CPT

## 2018-05-01 ENCOUNTER — OFFICE VISIT (OUTPATIENT)
Dept: INTERNAL MEDICINE CLINIC | Age: 68
End: 2018-05-01

## 2018-05-01 VITALS
BODY MASS INDEX: 34.91 KG/M2 | DIASTOLIC BLOOD PRESSURE: 73 MMHG | HEART RATE: 82 BPM | RESPIRATION RATE: 20 BRPM | SYSTOLIC BLOOD PRESSURE: 152 MMHG | WEIGHT: 197 LBS | HEIGHT: 63 IN | TEMPERATURE: 98.1 F | OXYGEN SATURATION: 95 %

## 2018-05-01 DIAGNOSIS — R23.8 SKIN IRRITATION: ICD-10-CM

## 2018-05-01 DIAGNOSIS — M79.672 PAIN OF LEFT HEEL: Primary | ICD-10-CM

## 2018-05-01 NOTE — LETTER
5/1/2018 [id number] [address] [facility] [address] [place] [address] [facility], Our records indicate that you are due for a Medicare Annual Wellness visit as of 3/14/2018. This is a benefit provided by Medicare, for all part B beneficiaries. I would like all of our patients to take advantage of this visit because it allows us to work with you to review your preventative care plan in addition to your regularly scheduled follow ups. Please call our office at [phone] to schedule this appointment at your earliest convenience. Sincerely, 
 
SIENNA Hyde [address] [place] [address] 
[phone]

## 2018-05-01 NOTE — PROGRESS NOTES
HISTORY OF PRESENT ILLNESS  Yves Rodríguez is a 75 y.o. female. HPI  Patient presents to the office for evaluation of heel pain and irritation of the right breast. She reports she has been having heel pain of the left heel. It has been going on for several weeks now. She first noticed the pain one morning when she got up. She reports she does not recall injuring her heel. She did go back walking with her daughter recently. She states she did not stretch and she walked for about 3 miles. Also she reports she does walk around in socks in the house. She reports aleve and ice does seem to help some. Lastly she reports she has irritation of her right breast. She reports the skin of the nipple was dry and itchy. She has been scratching the area. She states she ask the technician about it when she had her mammogram and she had suggested vitamin e. Review of Systems   Musculoskeletal:        Left heel pain   Skin:        Dry right breast nipple       Physical Exam   Musculoskeletal: She exhibits tenderness. Left foot- tenderness of the left heel increase pain with ambulating     Skin:   Right breast - dryness of the areola with some excoriation noted. No mass appreciated. (patient just had mammogram which was negative)       ASSESSMENT and PLAN  Diagnoses and all orders for this visit:    1. Pain of left heel  -     XR CALCANEUS LT; Future    2. Skin irritation    patient advised to take aleve twice daily for 3 days. Apply ice to the heel. She was given and shown stretching exercises. Suggested she try getting a heel cup. Advised her to stop walking without padded shoes. If not better consider seeing the podiatrist. Eric Duboise long walks for now. Suggested she get otc hydrocortisone for itchy breast and use eucerin. Hold on taking hot showers which dry the skin. If skin changes persist to let me know all this discussed with the patient and she understands and agrees.

## 2018-05-03 ENCOUNTER — TELEPHONE (OUTPATIENT)
Dept: INTERNAL MEDICINE CLINIC | Age: 68
End: 2018-05-03

## 2018-05-03 ENCOUNTER — HOSPITAL ENCOUNTER (OUTPATIENT)
Dept: GENERAL RADIOLOGY | Age: 68
Discharge: HOME | End: 2018-05-03
Payer: MEDICARE

## 2018-05-03 DIAGNOSIS — M79.672 PAIN OF LEFT HEEL: ICD-10-CM

## 2018-05-03 PROCEDURE — 73650 X-RAY EXAM OF HEEL: CPT

## 2018-05-03 NOTE — PROGRESS NOTES
Please let the patient know she has some mild heel spurs.  If conservative treatment don't work then I will get her to see the podiatrist. thanks

## 2018-05-03 NOTE — TELEPHONE ENCOUNTER
Writer received call back from patient, message given of xray and instruction per Mindi Griggs, patient verbalized understanding.

## 2018-05-07 ENCOUNTER — TELEPHONE (OUTPATIENT)
Dept: INTERNAL MEDICINE CLINIC | Age: 68
End: 2018-05-07

## 2018-05-07 DIAGNOSIS — M72.2 PLANTAR FASCIITIS: ICD-10-CM

## 2018-05-07 DIAGNOSIS — M77.30 CALCANEAL SPUR, UNSPECIFIED LATERALITY: Primary | ICD-10-CM

## 2018-05-15 DIAGNOSIS — E78.5 HYPERLIPIDEMIA, UNSPECIFIED HYPERLIPIDEMIA TYPE: ICD-10-CM

## 2018-05-15 DIAGNOSIS — I10 ESSENTIAL HYPERTENSION: ICD-10-CM

## 2018-05-15 RX ORDER — LOSARTAN POTASSIUM 50 MG/1
TABLET ORAL
Qty: 90 TAB | Status: SHIPPED | OUTPATIENT
Start: 2018-05-15 | End: 2018-12-21 | Stop reason: SDUPTHER

## 2018-05-15 RX ORDER — ATORVASTATIN CALCIUM 10 MG/1
TABLET, FILM COATED ORAL
Qty: 90 TAB | Refills: 1 | Status: SHIPPED | OUTPATIENT
Start: 2018-05-15 | End: 2018-12-21 | Stop reason: SDUPTHER

## 2018-05-29 RX ORDER — ESOMEPRAZOLE MAGNESIUM 40 MG/1
CAPSULE, DELAYED RELEASE ORAL
Qty: 30 CAP | Refills: 1 | Status: SHIPPED | OUTPATIENT
Start: 2018-05-29 | End: 2021-03-25 | Stop reason: SDUPTHER

## 2018-07-31 DIAGNOSIS — I10 ESSENTIAL HYPERTENSION: ICD-10-CM

## 2018-07-31 RX ORDER — HYDROCHLOROTHIAZIDE 25 MG/1
TABLET ORAL
Qty: 30 TAB | Refills: 5 | Status: SHIPPED | OUTPATIENT
Start: 2018-07-31 | End: 2019-02-04 | Stop reason: SDUPTHER

## 2018-11-05 RX ORDER — SERTRALINE HYDROCHLORIDE 25 MG/1
TABLET, FILM COATED ORAL
Qty: 30 TAB | Refills: 5 | Status: SHIPPED | OUTPATIENT
Start: 2018-11-05 | End: 2019-03-08 | Stop reason: SDUPTHER

## 2018-12-21 DIAGNOSIS — I10 ESSENTIAL HYPERTENSION: ICD-10-CM

## 2018-12-21 DIAGNOSIS — E78.5 HYPERLIPIDEMIA, UNSPECIFIED HYPERLIPIDEMIA TYPE: ICD-10-CM

## 2018-12-21 RX ORDER — LOSARTAN POTASSIUM 50 MG/1
TABLET ORAL
Qty: 90 TAB | Refills: 1 | Status: SHIPPED | OUTPATIENT
Start: 2018-12-21 | End: 2019-07-10 | Stop reason: SDUPTHER

## 2018-12-26 RX ORDER — ATORVASTATIN CALCIUM 10 MG/1
TABLET, FILM COATED ORAL
Qty: 90 TAB | Refills: 1 | Status: SHIPPED | OUTPATIENT
Start: 2018-12-26 | End: 2019-07-10 | Stop reason: SDUPTHER

## 2019-01-07 ENCOUNTER — TELEPHONE (OUTPATIENT)
Dept: INTERNAL MEDICINE CLINIC | Age: 69
End: 2019-01-07

## 2019-01-07 NOTE — TELEPHONE ENCOUNTER
Writer LEX on VM to return call to office. Patient needs a Medicare Wellness appointment. This would be 40 minutes for Molly.

## 2019-03-08 ENCOUNTER — OFFICE VISIT (OUTPATIENT)
Dept: INTERNAL MEDICINE CLINIC | Age: 69
End: 2019-03-08

## 2019-03-08 VITALS — HEIGHT: 63 IN | WEIGHT: 186 LBS | TEMPERATURE: 98.3 F | BODY MASS INDEX: 32.96 KG/M2 | RESPIRATION RATE: 20 BRPM

## 2019-03-08 DIAGNOSIS — R73.09 ABNORMAL GLUCOSE: ICD-10-CM

## 2019-03-08 DIAGNOSIS — Z23 ENCOUNTER FOR IMMUNIZATION: ICD-10-CM

## 2019-03-08 DIAGNOSIS — E78.5 HYPERLIPIDEMIA, UNSPECIFIED HYPERLIPIDEMIA TYPE: ICD-10-CM

## 2019-03-08 DIAGNOSIS — I10 ESSENTIAL HYPERTENSION: ICD-10-CM

## 2019-03-08 DIAGNOSIS — Z00.00 MEDICARE ANNUAL WELLNESS VISIT, SUBSEQUENT: Primary | ICD-10-CM

## 2019-03-08 RX ORDER — AZELASTINE 1 MG/ML
1 SPRAY, METERED NASAL 2 TIMES DAILY
Qty: 1 BOTTLE | Refills: 2 | Status: SHIPPED | OUTPATIENT
Start: 2019-03-08 | End: 2020-04-13 | Stop reason: SDUPTHER

## 2019-03-08 NOTE — PATIENT INSTRUCTIONS
Medicare Wellness Visit, Female     The best way to live healthy is to have a lifestyle where you eat a well-balanced diet, exercise regularly, limit alcohol use, and quit all forms of tobacco/nicotine, if applicable. Regular preventive services are another way to keep healthy. Preventive services (vaccines, screening tests, monitoring & exams) can help personalize your care plan, which helps you manage your own care. Screening tests can find health problems at the earliest stages, when they are easiest to treat. [facility] follows the current, evidence-based guidelines published by the [facility] ([facility]) when recommending preventive services for our patients. Because we follow these guidelines, sometimes recommendations change over time as research supports it. (For example, mammograms used to be recommended annually. Even though Medicare will still pay for an annual mammogram, the newer guidelines recommend a mammogram every two years for women of average risk.)  Of course, you and your doctor may decide to screen more often for some diseases, based on your risk and your health status. Preventive services for you include:  - Medicare offers their members a free annual wellness visit, which is time for you and your primary care provider to discuss and plan for your preventive service needs. Take advantage of this benefit every year!  -All adults over the age of 72 should receive the recommended pneumonia vaccines. Current USPSTF guidelines recommend a series of two vaccines for the best pneumonia protection.   -All adults should have a flu vaccine yearly and a tetanus vaccine every 10 years. All adults age 61 and older should receive a shingles vaccine once in their lifetime.    -A bone mass density test is recommended when a woman turns 65 to screen for osteoporosis. This test is only recommended one time, as a screening.  Some providers will use this same test as a disease monitoring tool if you already have osteoporosis. -All adults age 38-68 who are overweight should have a diabetes screening test once every three years.   -Other screening tests and preventive services for persons with diabetes include: an eye exam to screen for diabetic retinopathy, a kidney function test, a foot exam, and stricter control over your cholesterol.   -Cardiovascular screening for adults with routine risk involves an electrocardiogram (ECG) at intervals determined by your doctor.   -Colorectal cancer screenings should be done for adults age 54-65 with no increased risk factors for colorectal cancer. There are a number of acceptable methods of screening for this type of cancer. Each test has its own benefits and drawbacks. Discuss with your doctor what is most appropriate for you during your annual wellness visit. The different tests include: colonoscopy (considered the best screening method), a fecal occult blood test, a fecal DNA test, and sigmoidoscopy. -Breast cancer screenings are recommended every other year for women of normal risk, age 54-69.  -Cervical cancer screenings for women over age 72 are only recommended with certain risk factors.   -All adults born between [facility] should be screened once for Hepatitis C. Here is a list of your current Health Maintenance items (your personalized list of preventive services) with a due date:  Health Maintenance Due   Topic Date Due    Shingles Vaccine (1 of 2) 12/09/1999    Glaucoma Screening   12/17/2017    Eye Exam  12/17/2017    Annual Well Visit  03/14/2018    Cholesterol Test   05/05/2018    Diabetic Foot Care  05/30/2018    Flu Vaccine  08/01/2018    Hemoglobin A1C    09/08/2018    Albumin Urine Test  03/08/2019    Colonoscopy  06/17/2019         Vaccine Information Statement    Influenza (Flu) Vaccine (Inactivated or Recombinant):  What you need to know    Many Vaccine Information Statements are available in [place] and other languages. See www.immunize.org/vis  Hojas de Información Sobre Vacunas están disponibles en Español y en muchos otros idiomas. Visite www.immunize.org/vis    1. Why get vaccinated? Influenza (flu) is a contagious disease that spreads around the [place] every year, usually between October and May. Flu is caused by influenza viruses, and is spread mainly by coughing, sneezing, and close contact. Anyone can get flu. Flu strikes suddenly and can last several days. Symptoms vary by age, but can include:   fever/chills   sore throat   muscle aches   fatigue   cough   headache    runny or stuffy nose    Flu can also lead to pneumonia and blood infections, and cause diarrhea and seizures in children. If you have a medical condition, such as heart or lung disease, flu can make it worse. Flu is more dangerous for some people. Infants and young children, people 72years of age and older, pregnant women, and people with certain health conditions or a weakened immune system are at greatest risk. Each year thousands of people in the [facility] die from flu, and many more are hospitalized. Flu vaccine can:   keep you from getting flu,   make flu less severe if you do get it, and   keep you from spreading flu to your family and other people. 2. Inactivated and recombinant flu vaccines    A dose of flu vaccine is recommended every flu season. Children 6 months through 6years of age may need two doses during the same flu season. Everyone else needs only one dose each flu season. Some inactivated flu vaccines contain a very small amount of a mercury-based preservative called thimerosal. Studies have not shown thimerosal in vaccines to be harmful, but flu vaccines that do not contain thimerosal are available. There is no live flu virus in flu shots. They cannot cause the flu. There are many flu viruses, and they are always changing.  Each year a new flu vaccine is made to protect against three or four viruses that are likely to cause disease in the upcoming flu season. But even when the vaccine doesnt exactly match these viruses, it may still provide some protection    Flu vaccine cannot prevent:   flu that is caused by a virus not covered by the vaccine, or   illnesses that look like flu but are not. It takes about 2 weeks for protection to develop after vaccination, and protection lasts through the flu season. 3. Some people should not get this vaccine    Tell the person who is giving you the vaccine:     If you have any severe, life-threatening allergies. If you ever had a life-threatening allergic reaction after a dose of flu vaccine, or have a severe allergy to any part of this vaccine, you may be advised not to get vaccinated. Most, but not all, types of flu vaccine contain a small amount of egg protein.  If you ever had Guillain-Barré Syndrome (also called GBS). Some people with a history of GBS should not get this vaccine. This should be discussed with your doctor.  If you are not feeling well. It is usually okay to get flu vaccine when you have a mild illness, but you might be asked to come back when you feel better. 4. Risks of a vaccine reaction    With any medicine, including vaccines, there is a chance of reactions. These are usually mild and go away on their own, but serious reactions are also possible. Most people who get a flu shot do not have any problems with it. Minor problems following a flu shot include:    soreness, redness, or swelling where the shot was given     hoarseness   sore, red or itchy eyes   cough   fever   aches   headache   itching   fatigue  If these problems occur, they usually begin soon after the shot and last 1 or 2 days.      More serious problems following a flu shot can include the following:     There may be a small increased risk of Guillain-Barré Syndrome (GBS) after inactivated flu vaccine. This risk has been estimated at 1 or 2 additional cases per million people vaccinated. This is much lower than the risk of severe complications from flu, which can be prevented by flu vaccine.  Young children who get the flu shot along with pneumococcal vaccine (PCV13) and/or DTaP vaccine at the same time might be slightly more likely to have a seizure caused by fever. Ask your doctor for more information. Tell your doctor if a child who is getting flu vaccine has ever had a seizure. Problems that could happen after any injected vaccine:      People sometimes faint after a medical procedure, including vaccination. Sitting or lying down for about 15 minutes can help prevent fainting, and injuries caused by a fall. Tell your doctor if you feel dizzy, or have vision changes or ringing in the ears.  Some people get severe pain in the shoulder and have difficulty moving the arm where a shot was given. This happens very rarely.  Any medication can cause a severe allergic reaction. Such reactions from a vaccine are very rare, estimated at about 1 in a million doses, and would happen within a few minutes to a few hours after the vaccination. As with any medicine, there is a very remote chance of a vaccine causing a serious injury or death. The safety of vaccines is always being monitored. For more information, visit: www.cdc.gov/vaccinesafety/    5. What if there is a serious reaction? What should I look for?  Look for anything that concerns you, such as signs of a severe allergic reaction, very high fever, or unusual behavior. Signs of a severe allergic reaction can include hives, swelling of the face and throat, difficulty breathing, a fast heartbeat, dizziness, and weakness - usually within a few minutes to a few hours after the vaccination. What should I do?      If you think it is a severe allergic reaction or other emergency that cant wait, call 9-1-1 and get the person to the nearest hospital. Otherwise, call your doctor.  Reactions should be reported to the Vaccine Adverse Event Reporting System (VAERS). Your doctor should file this report, or you can do it yourself through  the VAERS web site at www.vaers. hhs.gov, or by calling 3-614.235.6081. VAERS does not give medical advice. 6. The National Vaccine Injury Compensation Program    The [facility] Vaccine Injury Compensation Program (VICP) is a federal program that was created to compensate people who may have been injured by certain vaccines. Persons who believe they may have been injured by a vaccine can learn about the program and about filing a claim by calling 7-941.233.5043 or visiting the [address] website at www.[facility].gov/vaccinecompensation. There is a time limit to file a claim for compensation. 7. How can I learn more?  Ask your healthcare provider. He or she can give you the vaccine package insert or suggest other sources of information.  Call your local or state health department.  Contact the Centers for Disease Control and Prevention (CDC):  - Call 8-902.210.1354 (1-800-CDC-INFO) or  - Visit CDCs website at www.cdc.gov/flu    Vaccine Information Statement   Inactivated Influenza Vaccine   8/7/2015  Bhargav Wilson [id number]    Department of Health and Human Services  Centers for Disease Control and Prevention    Office Use Only

## 2019-03-08 NOTE — PROGRESS NOTES
This is the Subsequent Medicare Annual Wellness Exam, performed 12 months or more after the Initial AWV or the last Subsequent AWV    I have reviewed the patient's medical history in detail and updated the computerized patient record. History     Past Medical History:   Diagnosis Date    Diabetes ([address]     Hypercholesterolemia     Hypertension       Past Surgical History:   Procedure Laterality Date    HX GYN      ectopic pregnancy x 2    HX GYN      ovarian cyst    HX TOTAL ABDOMINAL HYSTERECTOMY  1980    early 's     Current Outpatient Medications   Medication Sig Dispense Refill    atorvastatin (LIPITOR) 10 mg tablet TAKE 1 TABLET BY MOUTH ONCE DAILY 90 Tab 1    losartan (COZAAR) 50 mg tablet TAKE 1 TABLET BY MOUTH ONCE DAILY 90 Tab 1    esomeprazole (NEXIUM) 40 mg capsule TAKE 1 CAPSULE BY MOUTH ONCE DAILY 30 Cap 1    sertraline (ZOLOFT) 25 mg tablet TAKE ONE TABLET BY MOUTH ONCE DAILY 30 Tab 5    metFORMIN (GLUCOPHAGE) 1,000 mg tablet Take 1 Tab by mouth two (2) times daily (with meals). Indications: type 2 diabetes mellitus 180 Tab 1    hydroCHLOROthiazide (HYDRODIURIL) 25 mg tablet TAKE ONE TABLET BY MOUTH ONCE DAILY *NEEDS OFFICE VISIT FOR BLOOD PRESSURE CHECK* 30 Tab 5    Blood-Glucose Meter monitoring kit Use as directed. 1 Kit 0    calcium carbonate (TUMS) 200 mg calcium (500 mg) chew Take 1 Tab by mouth daily. Allergies   Allergen Reactions    Paxil [Paroxetine Hcl] Other (comments)     Made patient feel jittery. Family History   Problem Relation Age of Onset    Diabetes Mother     Hypertension Mother     Cancer Brother     Cancer Other     Breast Cancer Sister         over 48     Social History     Tobacco Use    Smoking status: Former Smoker     Packs/day: 0.25     Last attempt to quit: 2014     Years since quittin.6    Smokeless tobacco: Never Used   Substance Use Topics    Alcohol use:  Yes     Alcohol/week: 0.5 oz     Types: 1 Cans of beer per week Comment: once weekly     Patient Active Problem List   Diagnosis Code    GERD (gastroesophageal reflux disease) K21.9    Essential hypertension I10    Mixed hyperlipidemia E78.2    Diabetes mellitus type 2, controlled ([facility] [address] E11.9       Depression Risk Factor Screening:     3 most recent PHQ Screens 3/8/2019   Little interest or pleasure in doing things Not at all   Feeling down, depressed, irritable, or hopeless Not at all   Total Score PHQ 2 0     Alcohol Risk Factor Screening: On any occasion in the past three months you have had more than 3 drinks containing alcohol. Functional Ability and Level of Safety:   Hearing Loss  Hearing is good. Activities of Daily Living  The home contains: no safety equipment. Patient does total self care    Fall Risk  Fall Risk Assessment, last 12 mths 3/8/2019   Able to walk? Yes   Fall in past 12 months? No       Abuse Screen  Patient is not abused    Cognitive Screening   Evaluation of Cognitive Function:  Has your family/caregiver stated any concerns about your memory: no  Normal    Patient Care Team   Patient Care Team:  Randi Batres PA-C as PCP - General    Assessment/Plan   Education and counseling provided:  Are appropriate based on today's review and evaluation  Influenza Vaccine    Diagnoses and all orders for this visit:    1. Essential hypertension    2. Hyperlipidemia, unspecified hyperlipidemia type    3.  Abnormal glucose        Health Maintenance Due   Topic Date Due    Shingrix Vaccine Age 49> (1 of 2) 12/09/1999    GLAUCOMA SCREENING Q2Y  12/17/2017    EYE EXAM RETINAL OR DILATED  12/17/2017    MEDICARE YEARLY EXAM  03/14/2018    LIPID PANEL Q1  05/05/2018    FOOT EXAM Q1  05/30/2018    Influenza Age 5 to Adult  08/01/2018    HEMOGLOBIN A1C Q6M  09/08/2018    MICROALBUMIN Q1  03/08/2019    COLONOSCOPY  06/17/2019     Subjective:     Schuyler Gay is a 70 y.o. female who presents for follow up of diabetes, hypertension and hyperlipidemia. Diet and Lifestyle: no regular exercise. she does try to eat healthy at times  Home BP Monitoring: is not measured at home    Cardiovascular ROS: no medication side effects noted, no TIA's, no chest pain on exertion, no dyspnea on exertion, no swelling of ankles. New concerns: she states she has not been taking the metformin in a long time. She states she has not been checking her glucose either. Currently she is in the process of getting extensive dental work. She will eventually be getting dental implants. 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 She will be due her mammogram in April. Patient Active Problem List    Diagnosis Date Noted    Essential hypertension 06/21/2016    Mixed hyperlipidemia 06/21/2016    Diabetes mellitus type 2, controlled ([facility] [address] 06/21/2016    GERD (gastroesophageal reflux disease) 02/25/2014     Allergies   Allergen Reactions    Paxil [Paroxetine Hcl] Other (comments)     Made patient feel jittery. Review of Systems, additional:  Pertinent items are noted in HPI. Objective:     Visit Vitals  BP (P) 134/84   Pulse (!) (P) 103   Temp 98.3 °F (36.8 °C) (Oral)   Resp 20   Ht 5' 3\" (1.6 m)   Wt 186 lb (84.4 kg)   SpO2 (P) 95%   BMI 32.95 kg/m²     Appearance: alert, well appearing, and in no distress and overweight. General exam: CVS exam BP noted to be well controlled today in office, S1, S2 normal, no gallop, no murmur, chest clear, no edema, diabetic exam feet: warm, good capillary refill, normal DP and PT pulses and sensory intact. Lab review: orders written for new lab studies as appropriate; see orders. Assessment/Plan:     diabetes waiting for the labs, hypertension reasonably well controlled, hyperlipidemia waiting on labs. current treatment plan is effective, no change in therapy. Diagnoses and all orders for this visit:    1. Medicare annual wellness visit, subsequent    2.  Essential hypertension  - CBC WITH AUTOMATED DIFF  -     METABOLIC PANEL, COMPREHENSIVE  -     MICROALBUMIN, UR, RAND W/ MICROALB/CREAT RATIO    3. Hyperlipidemia, unspecified hyperlipidemia type  -     CBC WITH AUTOMATED DIFF  -     LIPID PANEL  -     METABOLIC PANEL, COMPREHENSIVE    4. Abnormal glucose  -     HEMOGLOBIN A1C WITH EAG    5. Encounter for immunization  -     INFLUENZA VACCINE INACTIVATED (IIV), SUBUNIT, ADJUVANTED, IM  -     HI IMMUNIZ ADMIN,1 SINGLE/COMB VAC/TOXOID    Other orders  -     azelastine (ASTELIN) 137 mcg (0.1 %) nasal spray; 1 [place] by Both Nostrils route two (2) times a day. Use in each nostril as directed    patient to get labs done. I would like for her to return in 3-4 months for follow up. I would like for her to get more regular exercise and watch her diet. All this was discussed with the patient and she understands and agrees.

## 2019-03-08 NOTE — PROGRESS NOTES
Wants to discuss when next mammogram due, patient has bilateral, then right only. Patient in process of getting her teeth fixed, health seems better per patient. Patient breasts are tender today.

## 2019-03-11 DIAGNOSIS — I10 ESSENTIAL HYPERTENSION: ICD-10-CM

## 2019-03-11 RX ORDER — HYDROCHLOROTHIAZIDE 25 MG/1
TABLET ORAL
Qty: 30 TAB | Refills: 0 | Status: SHIPPED | OUTPATIENT
Start: 2019-03-11 | End: 2019-04-05 | Stop reason: SDUPTHER

## 2019-03-12 LAB
ABSOLUTE BANDS, 67058: ABNORMAL
ABSOLUTE BLASTS: ABNORMAL
ABSOLUTE METAMYELOCYTES, 900360: ABNORMAL
ABSOLUTE MYELOCYTES: ABNORMAL
ABSOLUTE NRBC,ANRBC: ABNORMAL
ABSOLUTE PROMYELOCYTES: ABNORMAL
ALB/GLOBRATIO, 58C: 1.4 (CALC) (ref 1–2.5)
ALBUMIN SERPL-MCNC: 4.6 G/DL (ref 3.6–5.1)
ALP SERPL-CCNC: 134 U/L (ref 33–130)
ALT SERPL-CCNC: 16 U/L (ref 6–29)
AST SERPL W P-5'-P-CCNC: 12 U/L (ref 10–35)
BANDS,BANDS: ABNORMAL
BASOPHILS # BLD: 21 CELLS/UL (ref 0–200)
BASOPHILS NFR BLD: 0.2 %
BILIRUB SERPL-MCNC: 0.5 MG/DL (ref 0.2–1.2)
BLASTS,BLAST: ABNORMAL
BUN SERPL-MCNC: 15 MG/DL (ref 7–25)
BUN/CREATININE RATIO,BUCR: ABNORMAL (CALC) (ref 6–22)
CALCIUM SERPL-MCNC: 9.6 MG/DL (ref 8.6–10.4)
CHLORIDE SERPL-SCNC: 96 MMOL/L (ref 98–110)
CHOL/HDL RATIO,CHHDX: 3.9 (CALC)
CHOLEST SERPL-MCNC: 189 MG/DL
CO2 SERPL-SCNC: 26 MMOL/L (ref 20–32)
COMMENT(S): ABNORMAL
CREAT SERPL-MCNC: 0.79 MG/DL (ref 0.5–0.99)
CREATININE URINE,9612018: 143 MG/DL (ref 20–275)
EAG (MG/DL),9916804: 258 (CALC)
EAG (MMOL/L),9916805: 14.3 (CALC)
EOSINOPHIL # BLD: 11 CELLS/UL (ref 15–500)
EOSINOPHIL NFR BLD: 0.1 %
ERYTHROCYTE [DISTWIDTH] IN BLOOD BY AUTOMATED COUNT: 12.4 % (ref 11–15)
GLOBULIN,GLOB: 3.3 G/DL (CALC) (ref 1.9–3.7)
GLUCOSE SERPL-MCNC: 373 MG/DL (ref 65–99)
HBA1C MFR BLD HPLC: 10.6 % OF TOTAL HGB
HCT VFR BLD AUTO: 41.7 % (ref 35–45)
HDLC SERPL-MCNC: 48 MG/DL
HGB BLD-MCNC: 14.3 G/DL (ref 11.7–15.5)
LDL-CHOLESTEROL: 120 MG/DL (CALC)
LYMPHOCYTES # BLD: 2003 CELLS/UL (ref 850–3900)
LYMPHOCYTES NFR BLD: 18.9 %
MCH RBC QN AUTO: 31.6 PG (ref 27–33)
MCHC RBC AUTO-ENTMCNC: 34.3 G/DL (ref 32–36)
MCV RBC AUTO: 92.3 FL (ref 80–100)
METAMYELOCYTES,METAS: ABNORMAL
MICROALBUMIN,URINE RANDOM 140054: 15.2 MG/DL
MICROALBUMIN/CREAT RATIO: 106 MCG/MG CREAT
MONOCYTES # BLD: 488 CELLS/UL (ref 200–950)
MONOCYTES NFR BLD: 4.6 %
MYELOCYTES,MYELO: ABNORMAL
NEUTROPHILS # BLD AUTO: 8077 CELLS/UL (ref 1500–7800)
NEUTROPHILS # BLD: 76.2 %
NON-HDL CHOLESTEROL, 011976: 141 MG/DL (CALC)
NRBC: ABNORMAL
PLATELET # BLD AUTO: 254 THOUSAND/UL (ref 140–400)
PMV BLD AUTO: 10.7 FL (ref 7.5–12.5)
POTASSIUM SERPL-SCNC: 3.7 MMOL/L (ref 3.5–5.3)
PROMYELOCYTES,PRO: ABNORMAL
PROT SERPL-MCNC: 7.9 G/DL (ref 6.1–8.1)
RBC # BLD AUTO: 4.52 MILLION/UL (ref 3.8–5.1)
REACTIVE LYMPHS: ABNORMAL
SODIUM SERPL-SCNC: 135 MMOL/L (ref 135–146)
TRIGL SERPL-MCNC: 104 MG/DL (ref ?–150)
WBC # BLD AUTO: 10.6 THOUSAND/UL (ref 3.8–10.8)

## 2019-03-12 NOTE — PROGRESS NOTES
Writer contacted patient to inform of lab results and instruction per Cyrus Fischer, \"Your diabetes is out of control. Your hemoglobin A1c is 10.6. She should go back on her metformin immediately and watch her diet. Also get back to exercising. Let her know she is spilling a little protein in her urine. Her absolute neutrophils is elevated. I would like to recheck this in two weeks. LDL is a little elevated. Work the diet and exercise. Call for the order\", patient verbalized understanding. Copy of labs and future lab to be drawn in 2 weeks will be sent to patient home.

## 2019-03-12 NOTE — PROGRESS NOTES
Your diabetes is out of control. Your hemoglobin A1c is 10.6. She should go back on her metformin immediately and watch her diet. Also get back to exercising. Let her know she is spilling a little protein in her urine. Her absolute neutrophils is elevated. I would like to recheck this in two weeks. LDL is a little elevated. Work the diet and exercise. Call for the order.  thanks

## 2019-03-15 ENCOUNTER — TELEPHONE (OUTPATIENT)
Dept: INTERNAL MEDICINE CLINIC | Age: 69
End: 2019-03-15

## 2019-03-15 DIAGNOSIS — R79.89 ABNORMAL CBC: Primary | ICD-10-CM

## 2019-03-30 LAB
ABSOLUTE BANDS, 67058: NORMAL
ABSOLUTE BLASTS: NORMAL
ABSOLUTE METAMYELOCYTES, 900360: NORMAL
ABSOLUTE MYELOCYTES: NORMAL
ABSOLUTE NRBC,ANRBC: NORMAL
ABSOLUTE PROMYELOCYTES: NORMAL
BANDS,BANDS: NORMAL
BASOPHILS # BLD: 31 CELLS/UL (ref 0–200)
BASOPHILS NFR BLD: 0.4 %
BLASTS,BLAST: NORMAL
COMMENT(S): NORMAL
EOSINOPHIL # BLD: 31 CELLS/UL (ref 15–500)
EOSINOPHIL NFR BLD: 0.4 %
ERYTHROCYTE [DISTWIDTH] IN BLOOD BY AUTOMATED COUNT: 12.5 % (ref 11–15)
HCT VFR BLD AUTO: 41.4 % (ref 35–45)
HGB BLD-MCNC: 13.9 G/DL (ref 11.7–15.5)
LYMPHOCYTES # BLD: 2125 CELLS/UL (ref 850–3900)
LYMPHOCYTES NFR BLD: 27.6 %
MCH RBC QN AUTO: 31.1 PG (ref 27–33)
MCHC RBC AUTO-ENTMCNC: 33.6 G/DL (ref 32–36)
MCV RBC AUTO: 92.6 FL (ref 80–100)
METAMYELOCYTES,METAS: NORMAL
MONOCYTES # BLD: 470 CELLS/UL (ref 200–950)
MONOCYTES NFR BLD: 6.1 %
MYELOCYTES,MYELO: NORMAL
NEUTROPHILS # BLD AUTO: 5044 CELLS/UL (ref 1500–7800)
NEUTROPHILS # BLD: 65.5 %
NRBC: NORMAL
PLATELET # BLD AUTO: 271 THOUSAND/UL (ref 140–400)
PMV BLD AUTO: 10.2 FL (ref 7.5–12.5)
PROMYELOCYTES,PRO: NORMAL
RBC # BLD AUTO: 4.47 MILLION/UL (ref 3.8–5.1)
REACTIVE LYMPHS: NORMAL
WBC # BLD AUTO: 7.7 THOUSAND/UL (ref 3.8–10.8)

## 2019-04-03 ENCOUNTER — TELEPHONE (OUTPATIENT)
Dept: INTERNAL MEDICINE CLINIC | Age: 69
End: 2019-04-03

## 2019-04-03 NOTE — TELEPHONE ENCOUNTER
Pt was returning Elizabeth's call. She is calling concerning the medication METFORMIN causing nausea and vomiting. She can be reached at [phone].

## 2019-04-04 DIAGNOSIS — E11.9 TYPE 2 DIABETES MELLITUS WITHOUT COMPLICATION (HCC): ICD-10-CM

## 2019-04-04 NOTE — TELEPHONE ENCOUNTER
Writer contacted patient regarding her metformin and the nausea. Patient states for the period of taking 500mg in AM & PM, the nausea was not present and diarrhea is better. Now that patient has gone back to 1000mg in the AM and PM her nausea is back, without diarrhea. Patient will return to 500mg AM & PM until Michial Sails can get back to patient with a resolution to this problem, patient is aware Michial Sails will not be back until Monday.

## 2019-04-05 DIAGNOSIS — I10 ESSENTIAL HYPERTENSION: ICD-10-CM

## 2019-04-05 RX ORDER — HYDROCHLOROTHIAZIDE 25 MG/1
TABLET ORAL
Qty: 30 TAB | Refills: 0 | Status: SHIPPED | OUTPATIENT
Start: 2019-04-05 | End: 2019-05-08 | Stop reason: SDUPTHER

## 2019-04-16 DIAGNOSIS — E11.9 TYPE 2 DIABETES MELLITUS WITHOUT COMPLICATION (HCC): ICD-10-CM

## 2019-04-16 RX ORDER — INSULIN PUMP SYRINGE, 3 ML
EACH MISCELLANEOUS
Qty: 1 KIT | Refills: 0 | Status: SHIPPED | OUTPATIENT
Start: 2019-04-16

## 2019-04-16 RX ORDER — INSULIN PUMP SYRINGE, 3 ML
EACH MISCELLANEOUS
Qty: 1 KIT | Refills: 0 | Status: SHIPPED | OUTPATIENT
Start: 2019-04-16 | End: 2019-04-16 | Stop reason: SDUPTHER

## 2019-04-16 NOTE — TELEPHONE ENCOUNTER
Patient has gone back to 1000mg bid and her symptoms of nausea and diarrhea are not as bad due to patient taking 30 minutes after eating. Patient requesting new script for new meter, unable to monitor at this time.

## 2019-04-17 NOTE — TELEPHONE ENCOUNTER
Pt needs her test strips sent to [facility] on file, her meter was already sent but the strips were not, pt doesn't know what kind of strips she will need this is her first time having a meter.  Patient can be reached at [phone]

## 2019-04-19 ENCOUNTER — HOSPITAL ENCOUNTER (OUTPATIENT)
Dept: MAMMOGRAPHY | Age: 69
Discharge: HOME | End: 2019-04-19
Attending: PHYSICIAN ASSISTANT
Payer: MEDICARE

## 2019-04-19 DIAGNOSIS — Z12.39 SCREENING BREAST EXAMINATION: ICD-10-CM

## 2019-04-19 PROCEDURE — 77067 SCR MAMMO BI INCL CAD: CPT

## 2019-05-08 DIAGNOSIS — I10 ESSENTIAL HYPERTENSION: ICD-10-CM

## 2019-05-09 RX ORDER — SERTRALINE HYDROCHLORIDE 25 MG/1
TABLET, FILM COATED ORAL
Qty: 30 TAB | Refills: 5 | Status: SHIPPED | OUTPATIENT
Start: 2019-05-09 | End: 2019-05-30 | Stop reason: SDUPTHER

## 2019-05-09 RX ORDER — HYDROCHLOROTHIAZIDE 25 MG/1
TABLET ORAL
Qty: 30 TAB | Refills: 0 | Status: SHIPPED | OUTPATIENT
Start: 2019-05-09 | End: 2019-06-17 | Stop reason: SDUPTHER

## 2019-05-30 ENCOUNTER — OFFICE VISIT (OUTPATIENT)
Dept: INTERNAL MEDICINE CLINIC | Age: 69
End: 2019-05-30

## 2019-05-30 VITALS
WEIGHT: 179 LBS | DIASTOLIC BLOOD PRESSURE: 67 MMHG | RESPIRATION RATE: 20 BRPM | HEIGHT: 63 IN | BODY MASS INDEX: 31.71 KG/M2 | TEMPERATURE: 98.1 F | SYSTOLIC BLOOD PRESSURE: 139 MMHG | HEART RATE: 75 BPM | OXYGEN SATURATION: 97 %

## 2019-05-30 DIAGNOSIS — E11.9 TYPE 2 DIABETES MELLITUS WITHOUT COMPLICATION, WITHOUT LONG-TERM CURRENT USE OF INSULIN (HCC): Primary | ICD-10-CM

## 2019-05-30 RX ORDER — BLOOD-GLUCOSE METER
EACH MISCELLANEOUS
Refills: 0 | COMMUNITY
Start: 2019-04-16 | End: 2021-03-25 | Stop reason: SDUPTHER

## 2019-05-30 RX ORDER — METFORMIN HYDROCHLORIDE 1000 MG/1
1000 TABLET, FILM COATED, EXTENDED RELEASE ORAL DAILY
Qty: 30 TAB | Refills: 2 | Status: SHIPPED | OUTPATIENT
Start: 2019-05-30 | End: 2019-08-05

## 2019-05-30 NOTE — PROGRESS NOTES
Patient experiencing nausea from the metformin, taking 30 min after meals. Patient taking 1000mg once a day still experiencing nausea, feeling nauseous now after taking metformin 2 hours ago.

## 2019-05-30 NOTE — PROGRESS NOTES
HISTORY OF PRESENT ILLNESS  Ji Cheng is a 70 y.o. female. HPI  Patient presents to the office to discuss her metformin. She reports she has only been taking 1000 mg daily instead of the 2000 mg. She reports the metformin really messes with her stomach and makes her feel nauseated. She states because she has been feeling so nauseated she has been having a decrease in appetite as well. She has been walking on a regular basis since the last time she was here. She reports her weight has not changed much but she does believe she may have loss some inches because she had to buy smaller clothes. Allergies   Allergen Reactions    Paxil [Paroxetine Hcl] Other (comments)     Made patient feel jittery. Past Medical History:   Diagnosis Date    Diabetes ([facility] [address]     Hypercholesterolemia     Hypertension      Social History     Socioeconomic History    Marital status:      Spouse name: Not on file    Number of children: Not on file    Years of education: Not on file    Highest education level: Not on file   Occupational History    Not on file   Social Needs    Financial resource strain: Not on file    Food insecurity:     Worry: Not on file     Inability: Not on file    Transportation needs:     Medical: Not on file     Non-medical: Not on file   Tobacco Use    Smoking status: Former Smoker     Packs/day: 0.25     Last attempt to quit: 2014     Years since quittin.9    Smokeless tobacco: Never Used   Substance and Sexual Activity    Alcohol use:  Yes     Alcohol/week: 0.5 oz     Types: 1 Cans of beer per week     Comment: once weekly    Drug use: No    Sexual activity: Yes     Partners: Male   Lifestyle    Physical activity:     Days per week: Not on file     Minutes per session: Not on file    Stress: Not on file   Relationships    Social connections:     Talks on phone: Not on file     Gets together: Not on file     Attends Sabianist service: Not on file     Active member of club or organization: Not on file     Attends meetings of clubs or organizations: Not on file     Relationship status: Not on file    Intimate partner violence:     Fear of current or ex partner: Not on file     Emotionally abused: Not on file     Physically abused: Not on file     Forced sexual activity: Not on file   Other Topics Concern    Not on file   Social History Narrative    Not on file       Review of Systems   Gastrointestinal: Positive for abdominal pain, diarrhea and nausea. Negative for vomiting. Intermittent. Blood pressure 139/67, pulse 75, temperature 98.1 °F (36.7 °C), temperature source Oral, resp. rate 20, height 5' 3\" (1.6 m), weight 179 lb (81.2 kg), SpO2 97 %. Physical Exam   Constitutional: She appears well-developed and well-nourished. No distress. Cardiovascular: Normal rate and regular rhythm. No murmur heard. Pulmonary/Chest: Effort normal and breath sounds normal. She has no wheezes. Abdominal: Soft. Bowel sounds are normal. There is no tenderness. Psychiatric: She has a normal mood and affect. Her behavior is normal. Judgment and thought content normal.       ASSESSMENT and PLAN  Diagnoses and all orders for this visit:    1. Type 2 diabetes mellitus without complication, without long-term current use of insulin (HCC)  -     metFORMIN (GLUMETZA) 1,000 mg TG24 24 hour tablet; Take 1,000 mg by mouth daily. I explained to the patient that unfortunately some people can not tolerate metformin but they do okay with metformin ER. I would like for her to try the ER version of metformin. She is to let me know if this is better with her stomach. If not I will switch her to something different. Keep up the good work with exercising and watching diet. All this was discussed with the patient and she understands and agrees.

## 2019-05-31 ENCOUNTER — TELEPHONE (OUTPATIENT)
Dept: INTERNAL MEDICINE CLINIC | Age: 69
End: 2019-05-31

## 2019-06-11 ENCOUNTER — TELEPHONE (OUTPATIENT)
Dept: INTERNAL MEDICINE CLINIC | Age: 69
End: 2019-06-11

## 2019-06-11 NOTE — TELEPHONE ENCOUNTER
[facility] is calling  they need clarification on the PA for the Metformin, Ref # [id number], (P) [phone]

## 2019-06-17 DIAGNOSIS — I10 ESSENTIAL HYPERTENSION: ICD-10-CM

## 2019-06-17 RX ORDER — HYDROCHLOROTHIAZIDE 25 MG/1
TABLET ORAL
Qty: 30 TAB | Refills: 0 | Status: SHIPPED | OUTPATIENT
Start: 2019-06-17 | End: 2019-09-19 | Stop reason: SDUPTHER

## 2019-06-24 ENCOUNTER — OFFICE VISIT (OUTPATIENT)
Dept: INTERNAL MEDICINE CLINIC | Age: 69
End: 2019-06-24

## 2019-06-24 VITALS
HEART RATE: 85 BPM | OXYGEN SATURATION: 94 % | HEIGHT: 63 IN | BODY MASS INDEX: 31.01 KG/M2 | TEMPERATURE: 97.9 F | DIASTOLIC BLOOD PRESSURE: 64 MMHG | RESPIRATION RATE: 12 BRPM | WEIGHT: 175 LBS | SYSTOLIC BLOOD PRESSURE: 120 MMHG

## 2019-06-24 DIAGNOSIS — R05.9 COUGH: Primary | ICD-10-CM

## 2019-06-24 RX ORDER — CEFDINIR 300 MG/1
300 CAPSULE ORAL 2 TIMES DAILY
Qty: 20 CAP | Refills: 0 | Status: SHIPPED | OUTPATIENT
Start: 2019-06-24 | End: 2020-04-13 | Stop reason: ALTCHOICE

## 2019-06-24 RX ORDER — CODEINE PHOSPHATE AND GUAIFENESIN 10; 100 MG/5ML; MG/5ML
5 SOLUTION ORAL
Qty: 45 ML | Refills: 0 | Status: SHIPPED | OUTPATIENT
Start: 2019-06-24 | End: 2019-06-27

## 2019-06-24 RX ORDER — CODEINE PHOSPHATE AND GUAIFENESIN 10; 100 MG/5ML; MG/5ML
5 SOLUTION ORAL
Qty: 15 ML | Refills: 0 | Status: SHIPPED | OUTPATIENT
Start: 2019-06-24 | End: 2019-06-24 | Stop reason: CLARIF

## 2019-06-24 NOTE — PROGRESS NOTES
Symptoms: head congestion, nasal/clear, pnd, cough/yellow to white thick mucous, chest congestion. Taking mucinex, benadryl. Cough keeping patient up at night.

## 2019-06-24 NOTE — PROGRESS NOTES
HISTORY OF PRESENT ILLNESS  Beverly Moreno is a 70 y.o. female. HPI  Patient presents to the office for evaluation of cold symptoms. She states she first started with the symptoms two weeks ago. She states no fever. She reports the mucous is now yellow. She thought she was getting better but last Thursday it came back on her again. Admits to tickle in her throat. She has not noticed a wheeze. The cough has been keeping her up at night. She has been taking theraflu and at first mucinex. Allergies   Allergen Reactions    Paxil [Paroxetine Hcl] Other (comments)     Made patient feel jittery. Past Medical History:   Diagnosis Date    Diabetes ([address]     Hypercholesterolemia     Hypertension        Review of Systems   Constitutional: Positive for malaise/fatigue. Negative for chills and fever. Respiratory: Positive for cough and sputum production. Negative for shortness of breath and wheezing. Cardiovascular: Negative for chest pain. Blood pressure 120/64, pulse 85, temperature 97.9 °F (36.6 °C), temperature source Oral, resp. rate 12, height 5' 3\" (1.6 m), weight 175 lb (79.4 kg), SpO2 94 %. Physical Exam   Constitutional: She appears well-developed and well-nourished. No distress. HENT:   TM's intact  Throat mild erythema with clear appearing discharge retropharynx  No facial pain   Cardiovascular: Normal rate and regular rhythm. No murmur heard. Pulmonary/Chest: Effort normal and breath sounds normal. She has no wheezes. Currently no wheeze   Lymphadenopathy:     She has no cervical adenopathy. ASSESSMENT and PLAN  Diagnoses and all orders for this visit:    1. Cough  -     cefdinir (OMNICEF) 300 mg capsule; Take 1 Cap by mouth two (2) times a day. -     guaiFENesin-codeine (CHERATUSSIN AC) 100-10 mg/5 mL solution; Take 5 mL by mouth three (3) times daily as needed for Cough for up to 3 days. Max Daily Amount: 15 mL. take medication as prescribed.  Advised her to take her astelin that she has at home. Advised her to continue to hydrate during the day. No driving or using machinery if taking the cough medication. She should follow up if not better. All this was discussed with the patient and she understands and agrees.

## 2019-07-09 ENCOUNTER — TELEPHONE (OUTPATIENT)
Dept: INTERNAL MEDICINE CLINIC | Age: 69
End: 2019-07-09

## 2019-07-09 DIAGNOSIS — E78.5 HYPERLIPIDEMIA, UNSPECIFIED HYPERLIPIDEMIA TYPE: ICD-10-CM

## 2019-07-09 DIAGNOSIS — I10 ESSENTIAL HYPERTENSION: Primary | ICD-10-CM

## 2019-07-09 DIAGNOSIS — E11.9 TYPE 2 DIABETES MELLITUS WITHOUT COMPLICATION, WITHOUT LONG-TERM CURRENT USE OF INSULIN (HCC): ICD-10-CM

## 2019-07-10 DIAGNOSIS — E78.5 HYPERLIPIDEMIA, UNSPECIFIED HYPERLIPIDEMIA TYPE: ICD-10-CM

## 2019-07-10 DIAGNOSIS — I10 ESSENTIAL HYPERTENSION: ICD-10-CM

## 2019-07-11 RX ORDER — ATORVASTATIN CALCIUM 10 MG/1
TABLET, FILM COATED ORAL
Qty: 90 TAB | Refills: 1 | Status: SHIPPED | OUTPATIENT
Start: 2019-07-11 | End: 2020-01-14

## 2019-07-11 RX ORDER — HYDROCHLOROTHIAZIDE 25 MG/1
TABLET ORAL
Qty: 30 TAB | Refills: 0 | Status: SHIPPED | OUTPATIENT
Start: 2019-07-11 | End: 2019-08-16 | Stop reason: SDUPTHER

## 2019-07-11 RX ORDER — LOSARTAN POTASSIUM 50 MG/1
TABLET ORAL
Qty: 90 TAB | Refills: 1 | Status: SHIPPED | OUTPATIENT
Start: 2019-07-11 | End: 2020-01-14

## 2019-07-22 ENCOUNTER — TELEPHONE (OUTPATIENT)
Dept: INTERNAL MEDICINE CLINIC | Age: 69
End: 2019-07-22

## 2019-07-22 DIAGNOSIS — I10 ESSENTIAL HYPERTENSION: Primary | ICD-10-CM

## 2019-07-22 DIAGNOSIS — E11.9 TYPE 2 DIABETES MELLITUS WITHOUT COMPLICATION, WITHOUT LONG-TERM CURRENT USE OF INSULIN (HCC): ICD-10-CM

## 2019-07-22 DIAGNOSIS — E78.5 HYPERLIPIDEMIA, UNSPECIFIED HYPERLIPIDEMIA TYPE: ICD-10-CM

## 2019-07-22 NOTE — TELEPHONE ENCOUNTER
Ms Rea Langley received her lab order but needs another one for [employer]. Please re-mail another order.

## 2019-07-31 ENCOUNTER — TELEPHONE (OUTPATIENT)
Dept: INTERNAL MEDICINE CLINIC | Age: 69
End: 2019-07-31

## 2019-07-31 DIAGNOSIS — E11.9 TYPE 2 DIABETES MELLITUS WITHOUT COMPLICATION, WITHOUT LONG-TERM CURRENT USE OF INSULIN (HCC): Primary | ICD-10-CM

## 2019-07-31 LAB
ABSOLUTE BANDS, 67058: ABNORMAL
ABSOLUTE BLASTS: ABNORMAL
ABSOLUTE METAMYELOCYTES, 900360: ABNORMAL
ABSOLUTE MYELOCYTES: ABNORMAL
ABSOLUTE NRBC,ANRBC: ABNORMAL
ABSOLUTE PROMYELOCYTES: ABNORMAL
ALB/GLOBRATIO, 58C: 1.6 (CALC) (ref 1–2.5)
ALBUMIN SERPL-MCNC: 4.2 G/DL (ref 3.6–5.1)
ALP SERPL-CCNC: 106 U/L (ref 33–130)
ALT SERPL-CCNC: 14 U/L (ref 6–29)
AST SERPL W P-5'-P-CCNC: 11 U/L (ref 10–35)
BANDS,BANDS: ABNORMAL
BASOPHILS # BLD: 23 CELLS/UL (ref 0–200)
BASOPHILS NFR BLD: 0.4 %
BILIRUB SERPL-MCNC: 0.4 MG/DL (ref 0.2–1.2)
BLASTS,BLAST: ABNORMAL
BUN SERPL-MCNC: 12 MG/DL (ref 7–25)
BUN/CREATININE RATIO,BUCR: ABNORMAL (CALC) (ref 6–22)
CALCIUM SERPL-MCNC: 8.8 MG/DL (ref 8.6–10.4)
CHLORIDE SERPL-SCNC: 98 MMOL/L (ref 98–110)
CHOL/HDL RATIO,CHHDX: 3.2 (CALC)
CHOLEST SERPL-MCNC: 149 MG/DL
CO2 SERPL-SCNC: 26 MMOL/L (ref 20–32)
COMMENT(S): ABNORMAL
CREAT SERPL-MCNC: 0.68 MG/DL (ref 0.5–0.99)
EAG (MG/DL),9916804: 286 (CALC)
EAG (MMOL/L),9916805: 15.9 (CALC)
EOSINOPHIL # BLD: 11 CELLS/UL (ref 15–500)
EOSINOPHIL NFR BLD: 0.2 %
ERYTHROCYTE [DISTWIDTH] IN BLOOD BY AUTOMATED COUNT: 12.4 % (ref 11–15)
GLOBULIN,GLOB: 2.7 G/DL (CALC) (ref 1.9–3.7)
GLUCOSE SERPL-MCNC: 411 MG/DL (ref 65–139)
HBA1C MFR BLD HPLC: 11.6 % OF TOTAL HGB
HCT VFR BLD AUTO: 39.1 % (ref 35–45)
HDLC SERPL-MCNC: 47 MG/DL
HGB BLD-MCNC: 13.1 G/DL (ref 11.7–15.5)
LDL-CHOLESTEROL: 84 MG/DL (CALC)
LYMPHOCYTES # BLD: 1488 CELLS/UL (ref 850–3900)
LYMPHOCYTES NFR BLD: 26.1 %
MCH RBC QN AUTO: 31.6 PG (ref 27–33)
MCHC RBC AUTO-ENTMCNC: 33.5 G/DL (ref 32–36)
MCV RBC AUTO: 94.2 FL (ref 80–100)
METAMYELOCYTES,METAS: ABNORMAL
MONOCYTES # BLD: 410 CELLS/UL (ref 200–950)
MONOCYTES NFR BLD: 7.2 %
MYELOCYTES,MYELO: ABNORMAL
NEUTROPHILS # BLD AUTO: 3768 CELLS/UL (ref 1500–7800)
NEUTROPHILS # BLD: 66.1 %
NON-HDL CHOLESTEROL, 011976: 102 MG/DL (CALC)
NRBC: ABNORMAL
PLATELET # BLD AUTO: 214 THOUSAND/UL (ref 140–400)
PMV BLD AUTO: 10.4 FL (ref 7.5–12.5)
POTASSIUM SERPL-SCNC: 3.8 MMOL/L (ref 3.5–5.3)
PROMYELOCYTES,PRO: ABNORMAL
PROT SERPL-MCNC: 6.9 G/DL (ref 6.1–8.1)
RBC # BLD AUTO: 4.15 MILLION/UL (ref 3.8–5.1)
REACTIVE LYMPHS: ABNORMAL
SODIUM SERPL-SCNC: 135 MMOL/L (ref 135–146)
TRIGL SERPL-MCNC: 86 MG/DL (ref ?–150)
WBC # BLD AUTO: 5.7 THOUSAND/UL (ref 3.8–10.8)

## 2019-07-31 NOTE — TELEPHONE ENCOUNTER
Patient informed with recent lab results. Patient mentioned last week she started back to walking, and doing exercises at home. Patient states she is not currently on medication (Metformin) because insurance would not pay. Patient states she has been watching what she eats but the morning of getting labs done she did drink coffee with sugar/cream.      Patient states she will work on diet. Patient asked if we had a food guide she can follow?

## 2019-07-31 NOTE — TELEPHONE ENCOUNTER
Please contact the patient and let her know her diabetes is extremely out of control. Has she been taking her medication, watching her diet, exercising? Other labs are okay.  thanks

## 2019-07-31 NOTE — TELEPHONE ENCOUNTER
----- Message from Nayan Hendrix PA-C sent at 7/31/2019  7:49 AM EDT -----  Please contact the patient and let her know her diabetes is extremely out of control. Has she been taking her medication, watching her diet, exercising? Other labs are okay.  thanks

## 2019-08-01 NOTE — TELEPHONE ENCOUNTER
Please let the patient know if she is not going to take the metformin then we need to find something else for her to take because the diet is not working.  Also let her know the coffee she had that morning did not effect her hemoglobin a1c. thanks

## 2019-08-05 DIAGNOSIS — E11.9 TYPE 2 DIABETES MELLITUS WITHOUT COMPLICATION, WITHOUT LONG-TERM CURRENT USE OF INSULIN (HCC): ICD-10-CM

## 2019-08-05 RX ORDER — METFORMIN HYDROCHLORIDE 500 MG/1
TABLET, FILM COATED, EXTENDED RELEASE ORAL
Qty: 45 TAB | Refills: 3 | Status: CANCELLED | OUTPATIENT
Start: 2019-08-05

## 2019-08-05 RX ORDER — METFORMIN HYDROCHLORIDE 500 MG/1
TABLET, FILM COATED, EXTENDED RELEASE ORAL
Qty: 45 TAB | Refills: 3 | Status: SHIPPED | OUTPATIENT
Start: 2019-08-05 | End: 2019-08-09 | Stop reason: ALTCHOICE

## 2019-08-05 NOTE — TELEPHONE ENCOUNTER
Writer contacted patient to discuss her metformin and plan, offered appointment to come in and discuss. Patient is also concerned at this time and stated she is working hard on her diet, has started walking every day started the last 2 weeks and is willing to restart the metformin at a lower dose, patient states she cannot do 1000 mg a day, she is nauseous and sick to her stomach.

## 2019-08-05 NOTE — TELEPHONE ENCOUNTER
Please let the patient know that 500 mg of metformin will not control her diabetes. I am going to try the sending the extended release again.

## 2019-08-08 NOTE — TELEPHONE ENCOUNTER
Writer has spoken with [facility] [employer] [facility] and given all pertinent information to help patient get the [address]

## 2019-08-09 RX ORDER — METFORMIN HYDROCHLORIDE 500 MG/1
1000 TABLET, EXTENDED RELEASE ORAL
Qty: 180 TAB | Refills: 1 | Status: SHIPPED | OUTPATIENT
Start: 2019-08-09 | End: 2019-08-15 | Stop reason: SDUPTHER

## 2019-08-09 RX ORDER — METFORMIN HYDROCHLORIDE 500 MG/1
TABLET, EXTENDED RELEASE ORAL
Qty: 45 TAB | Refills: 0 | Status: SHIPPED | OUTPATIENT
Start: 2019-08-09 | End: 2020-04-02 | Stop reason: SDUPTHER

## 2019-08-09 NOTE — TELEPHONE ENCOUNTER
Writer contacted patient's pharmacy on file and was informed patient can get metformin xr 500mg for $4.00. Will send to [facility] for new script and will inform patient.

## 2019-08-14 DIAGNOSIS — I10 ESSENTIAL HYPERTENSION: ICD-10-CM

## 2019-08-15 RX ORDER — METFORMIN HYDROCHLORIDE 500 MG/1
1000 TABLET, EXTENDED RELEASE ORAL
Qty: 180 TAB | Refills: 1 | Status: SHIPPED | OUTPATIENT
Start: 2019-08-15 | End: 2020-04-02 | Stop reason: SDUPTHER

## 2019-08-15 RX ORDER — HYDROCHLOROTHIAZIDE 25 MG/1
TABLET ORAL
Qty: 30 TAB | Refills: 5 | Status: SHIPPED | OUTPATIENT
Start: 2019-08-15 | End: 2020-04-02 | Stop reason: SDUPTHER

## 2019-08-16 ENCOUNTER — TELEPHONE (OUTPATIENT)
Dept: INTERNAL MEDICINE CLINIC | Age: 69
End: 2019-08-16

## 2019-08-16 DIAGNOSIS — I10 ESSENTIAL HYPERTENSION: ICD-10-CM

## 2019-08-16 RX ORDER — SERTRALINE HYDROCHLORIDE 25 MG/1
TABLET, FILM COATED ORAL
Qty: 30 TAB | Refills: 5 | Status: SHIPPED | OUTPATIENT
Start: 2019-08-16 | End: 2019-08-19 | Stop reason: SDUPTHER

## 2019-08-16 RX ORDER — HYDROCHLOROTHIAZIDE 25 MG/1
TABLET ORAL
Qty: 30 TAB | Refills: 0 | Status: SHIPPED | OUTPATIENT
Start: 2019-08-16 | End: 2019-08-19 | Stop reason: SDUPTHER

## 2019-08-16 NOTE — TELEPHONE ENCOUNTER
Pt would need a prescription written sertraline, and  Hydrochlorothiazide. Pt would need these [facility] [place]. Pt did request for her medication to be sent to [facility] mail order but Pt is going to run out of medication on 8/19/ before Jason can send her medication.  Pts contact [phone]      will

## 2019-08-19 RX ORDER — SERTRALINE HYDROCHLORIDE 25 MG/1
25 TABLET, FILM COATED ORAL DAILY
Qty: 30 TAB | Refills: 1 | Status: SHIPPED | OUTPATIENT
Start: 2019-08-19 | End: 2020-04-02 | Stop reason: SDUPTHER

## 2019-08-19 RX ORDER — HYDROCHLOROTHIAZIDE 25 MG/1
TABLET ORAL
Qty: 30 TAB | Refills: 1 | Status: SHIPPED | OUTPATIENT
Start: 2019-08-19 | End: 2020-04-02 | Stop reason: SDUPTHER

## 2020-01-14 DIAGNOSIS — E78.5 HYPERLIPIDEMIA, UNSPECIFIED HYPERLIPIDEMIA TYPE: ICD-10-CM

## 2020-01-14 DIAGNOSIS — I10 ESSENTIAL HYPERTENSION: ICD-10-CM

## 2020-01-14 RX ORDER — LOSARTAN POTASSIUM 50 MG/1
TABLET ORAL
Qty: 90 TAB | Refills: 1 | Status: SHIPPED | OUTPATIENT
Start: 2020-01-14 | End: 2020-01-16 | Stop reason: SDUPTHER

## 2020-01-14 RX ORDER — SERTRALINE HYDROCHLORIDE 25 MG/1
TABLET, FILM COATED ORAL
Qty: 90 TAB | Refills: 1 | Status: SHIPPED | OUTPATIENT
Start: 2020-01-14 | End: 2020-04-02 | Stop reason: SDUPTHER

## 2020-01-14 RX ORDER — ATORVASTATIN CALCIUM 10 MG/1
TABLET, FILM COATED ORAL
Qty: 90 TAB | Refills: 1 | Status: SHIPPED | OUTPATIENT
Start: 2020-01-14 | End: 2020-01-16 | Stop reason: SDUPTHER

## 2020-01-16 DIAGNOSIS — E78.5 HYPERLIPIDEMIA, UNSPECIFIED HYPERLIPIDEMIA TYPE: ICD-10-CM

## 2020-01-16 DIAGNOSIS — I10 ESSENTIAL HYPERTENSION: ICD-10-CM

## 2020-01-16 RX ORDER — ATORVASTATIN CALCIUM 10 MG/1
TABLET, FILM COATED ORAL
Qty: 10 TAB | Refills: 0 | Status: SHIPPED | OUTPATIENT
Start: 2020-01-16 | End: 2020-04-02 | Stop reason: SDUPTHER

## 2020-01-16 RX ORDER — LOSARTAN POTASSIUM 50 MG/1
TABLET ORAL
Qty: 10 TAB | Refills: 0 | Status: SHIPPED | OUTPATIENT
Start: 2020-01-16 | End: 2020-04-02 | Stop reason: SDUPTHER

## 2020-01-16 NOTE — TELEPHONE ENCOUNTER
[phone]       Name of medication and dosage if known:losartan (COZAAR) 50 mg and atorvastatin (LIPITOR) 10 mg   Pt requesting approximately 10 pill of each to hold her over until she received the full prescription in the mail from [facility] phone number: [phone]       will

## 2020-03-31 DIAGNOSIS — I10 ESSENTIAL HYPERTENSION: ICD-10-CM

## 2020-03-31 RX ORDER — HYDROCHLOROTHIAZIDE 25 MG/1
TABLET ORAL
Qty: 90 TAB | Refills: 1 | Status: SHIPPED | OUTPATIENT
Start: 2020-03-31 | End: 2020-04-02 | Stop reason: SDUPTHER

## 2020-04-02 ENCOUNTER — VIRTUAL VISIT (OUTPATIENT)
Dept: INTERNAL MEDICINE CLINIC | Age: 70
End: 2020-04-02

## 2020-04-02 VITALS
BODY MASS INDEX: 28.35 KG/M2 | HEIGHT: 63 IN | DIASTOLIC BLOOD PRESSURE: 68 MMHG | SYSTOLIC BLOOD PRESSURE: 120 MMHG | WEIGHT: 160 LBS

## 2020-04-02 DIAGNOSIS — F32.A DEPRESSION, UNSPECIFIED DEPRESSION TYPE: ICD-10-CM

## 2020-04-02 DIAGNOSIS — I10 ESSENTIAL HYPERTENSION: ICD-10-CM

## 2020-04-02 DIAGNOSIS — E78.5 HYPERLIPIDEMIA, UNSPECIFIED HYPERLIPIDEMIA TYPE: ICD-10-CM

## 2020-04-02 DIAGNOSIS — E11.9 TYPE 2 DIABETES MELLITUS WITHOUT COMPLICATION, WITHOUT LONG-TERM CURRENT USE OF INSULIN (HCC): Primary | ICD-10-CM

## 2020-04-02 RX ORDER — ATORVASTATIN CALCIUM 10 MG/1
TABLET, FILM COATED ORAL
Qty: 90 TAB | Refills: 1 | Status: SHIPPED | OUTPATIENT
Start: 2020-04-02 | End: 2020-09-30 | Stop reason: SDUPTHER

## 2020-04-02 RX ORDER — SERTRALINE HYDROCHLORIDE 25 MG/1
25 TABLET, FILM COATED ORAL DAILY
Qty: 90 TAB | Refills: 1 | Status: SHIPPED | OUTPATIENT
Start: 2020-04-02 | End: 2020-10-13 | Stop reason: SDUPTHER

## 2020-04-02 RX ORDER — METFORMIN HYDROCHLORIDE 500 MG/1
1000 TABLET, EXTENDED RELEASE ORAL
Qty: 180 TAB | Refills: 1 | Status: SHIPPED | OUTPATIENT
Start: 2020-04-02 | End: 2020-09-29 | Stop reason: SDUPTHER

## 2020-04-02 RX ORDER — HYDROCHLOROTHIAZIDE 25 MG/1
TABLET ORAL
Qty: 90 TAB | Refills: 1 | Status: SHIPPED | OUTPATIENT
Start: 2020-04-02 | End: 2020-07-20 | Stop reason: SDUPTHER

## 2020-04-02 RX ORDER — LOSARTAN POTASSIUM 50 MG/1
TABLET ORAL
Qty: 90 TAB | Refills: 1 | Status: SHIPPED | OUTPATIENT
Start: 2020-04-02 | End: 2020-09-30 | Stop reason: SDUPTHER

## 2020-04-02 NOTE — PROGRESS NOTES
Subjective: This was a real time audio visit. Visit was done through [facility]  Derian Chavez is a 78 y.o. female seen for follow up of diabetes. She also has diabetes, hypertension and hyperlipidemia. Diabetic Review of Systems - medication compliance: compliant all of the time, diabetic diet compliance: noncompliant some of the time. Other symptoms and concerns: patient need medications refilled. Patient reports that she has been doing well. She reports that she has not been following through with her diet. Admits to eating sweets and foods high in starch. She reports that recently she has not been exercising. She states that she did finish all of her dental work but she has not been able to tolerate her dentures for a long period of time. She states that she has lost some weight and she believes it is due to her new teeth. She reports that at times are just not comfortable. Since the last visit the patient states she has not got her shingles vaccine and she was not able to see the eye doctor. She reports that her blood sugars has been running a little high but she knows it is because she is not doing the right thing. She shares that her blood pressure has been excellent at home. 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 Patient Active Problem List    Diagnosis Date Noted    Essential hypertension 06/21/2016    Mixed hyperlipidemia 06/21/2016    Diabetes mellitus type 2, controlled ([facility] [address] 06/21/2016    GERD (gastroesophageal reflux disease) 02/25/2014     Current Outpatient Medications   Medication Sig Dispense Refill    atorvastatin (LIPITOR) 10 mg tablet TAKE 1 TABLET EVERY DAY 90 Tab 1    sertraline (ZOLOFT) 25 mg tablet Take 1 Tab by mouth daily.  90 Tab 1    hydroCHLOROthiazide (HYDRODIURIL) 25 mg tablet TAKE ONE TABLET BY MOUTH ONCE DAILY *NEEDS OFFICE VISIT FOR BLOOD PRESSURE CHECK* 90 Tab 1    losartan (COZAAR) 50 mg tablet TAKE 1 TABLET EVERY DAY 90 Tab 1    metFORMIN ER (GLUCOPHAGE XR) 500 mg tablet Take 2 Tabs by mouth daily (with dinner). 180 Tab 1    cefdinir (OMNICEF) 300 mg capsule Take 1 Cap by mouth two (2) times a day. 20 Cap 0    TRUE METRIX GLUCOSE METER misc   0    glucose blood VI test strips (ASCENSIA AUTODISC VI, ONE TOUCH ULTRA TEST VI) strip Please test two times daily 100 Strip 3    Blood-Glucose Meter monitoring kit Test Once Daily in morning before breakfast \"E11.9\" 1 Kit 0    azelastine (ASTELIN) 137 mcg (0.1 %) nasal spray 1 [place] by Both Nostrils route two (2) times a day. Use in each nostril as directed 1 Bottle 2    esomeprazole (NEXIUM) 40 mg capsule TAKE 1 CAPSULE BY MOUTH ONCE DAILY 30 Cap 1    calcium carbonate (TUMS) 200 mg calcium (500 mg) chew Take 1 Tab by mouth daily. Allergies   Allergen Reactions    Paxil [Paroxetine Hcl] Other (comments)     Made patient feel jittery. Review of Systems  Pertinent items are noted in HPI. Objective:     Visit Vitals  /68   Ht 5' 3\" (1.6 m)   Wt 160 lb (72.6 kg)   BMI 28.34 kg/m²     Appearance: well sounding. Exam: psych- normal mood and affect  Lab review: orders written for new lab studies as appropriate; see orders. Not able to do visual exam because the patient's camera would not work correctly. Assessment/Plan:     diabetes needs further observation, needs improvement, hypertension reasonably well controlled, hyperlipidemia reasonably well controlled. Diabetic issues reviewed with her: we discussed diet today. Diagnoses and all orders for this visit:    1. Type 2 diabetes mellitus without complication, without long-term current use of insulin (HCC)  -     CBC WITH AUTOMATED DIFF; Future  -     METABOLIC PANEL, COMPREHENSIVE; Future  -     HEMOGLOBIN A1C WITH EAG; Future  -     MICROALBUMIN, UR, RAND W/ MICROALB/CREAT RATIO; Future  -     metFORMIN ER (GLUCOPHAGE XR) 500 mg tablet; Take 2 Tabs by mouth daily (with dinner).     2. Hyperlipidemia, unspecified hyperlipidemia type  -     CBC WITH AUTOMATED DIFF; Future  -     METABOLIC PANEL, COMPREHENSIVE; Future  -     LIPID PANEL; Future  -     atorvastatin (LIPITOR) 10 mg tablet; TAKE 1 TABLET EVERY DAY    3. Essential hypertension  -     CBC WITH AUTOMATED DIFF; Future  -     METABOLIC PANEL, COMPREHENSIVE; Future  -     MICROALBUMIN, UR, RAND W/ MICROALB/CREAT RATIO; Future  -     hydroCHLOROthiazide (HYDRODIURIL) 25 mg tablet; TAKE ONE TABLET BY MOUTH ONCE DAILY *NEEDS OFFICE VISIT FOR BLOOD PRESSURE CHECK*  -     losartan (COZAAR) 50 mg tablet; TAKE 1 TABLET EVERY DAY    4. Depression, unspecified depression type  -     sertraline (ZOLOFT) 25 mg tablet; Take 1 Tab by mouth daily. Patient I spoke at length about her diabetes and needing to get back on track with her diet. The patient reports she knows what she needs to do she just has not been doing it recently. I will be sending her a lab slip for her labs. I have advised her not to get her labs done right now due to the covid-19 virus. I would like for her to get her labs done once the risk of infection has decreased. The patient understands that she may contact the office for any concerns that she may have. I would like for the patient to follow-up with me in 3 months.       Total encounter time was 25 minutes; over 50% of the time was spent counseling and coordinating care regarding diabetes, diet changes, exercise, hypertension, and staying safe during the covid-19

## 2020-04-10 PROBLEM — R73.9 ACUTE HYPERGLYCEMIA: Status: ACTIVE | Noted: 2020-04-10

## 2020-04-10 PROBLEM — L25.0 CONTACT DERMATITIS DUE TO COSMETICS: Status: ACTIVE | Noted: 2020-04-10

## 2020-04-10 PROBLEM — M79.673 FOOT PAIN: Status: ACTIVE | Noted: 2020-04-10

## 2020-04-10 PROBLEM — M72.2 PLANTAR FASCIA SYNDROME: Status: ACTIVE | Noted: 2020-04-10

## 2020-04-10 PROBLEM — B37.31 CANDIDAL VAGINITIS: Status: ACTIVE | Noted: 2020-04-10

## 2020-04-10 PROBLEM — K85.10 ACUTE GALLSTONE PANCREATITIS: Status: ACTIVE | Noted: 2020-04-10

## 2020-04-10 PROBLEM — K81.0 ACUTE EMPHYSEMATOUS CHOLECYSTITIS: Status: ACTIVE | Noted: 2020-04-10

## 2020-04-13 ENCOUNTER — VIRTUAL VISIT (OUTPATIENT)
Dept: INTERNAL MEDICINE CLINIC | Age: 70
End: 2020-04-13

## 2020-04-13 VITALS
BODY MASS INDEX: 28.7 KG/M2 | WEIGHT: 162 LBS | SYSTOLIC BLOOD PRESSURE: 110 MMHG | DIASTOLIC BLOOD PRESSURE: 68 MMHG | TEMPERATURE: 98.6 F | HEIGHT: 63 IN

## 2020-04-13 DIAGNOSIS — J30.89 ENVIRONMENTAL AND SEASONAL ALLERGIES: ICD-10-CM

## 2020-04-13 DIAGNOSIS — Z00.00 MEDICARE ANNUAL WELLNESS VISIT, SUBSEQUENT: Primary | ICD-10-CM

## 2020-04-13 RX ORDER — AZELASTINE 1 MG/ML
1 SPRAY, METERED NASAL 2 TIMES DAILY
Qty: 3 BOTTLE | Refills: 1 | Status: SHIPPED | OUTPATIENT
Start: 2020-04-13

## 2020-04-13 NOTE — PATIENT INSTRUCTIONS
Medicare Wellness Visit, Female     The best way to live healthy is to have a lifestyle where you eat a well-balanced diet, exercise regularly, limit alcohol use, and quit all forms of tobacco/nicotine, if applicable. Regular preventive services are another way to keep healthy. Preventive services (vaccines, screening tests, monitoring & exams) can help personalize your care plan, which helps you manage your own care. Screening tests can find health problems at the earliest stages, when they are easiest to treat. [facility] follows the current, evidence-based guidelines published by the [facility] Barr ([facility]) when recommending preventive services for our patients. Because we follow these guidelines, sometimes recommendations change over time as research supports it. (For example, mammograms used to be recommended annually. Even though Medicare will still pay for an annual mammogram, the newer guidelines recommend a mammogram every two years for women of average risk). Of course, you and your doctor may decide to screen more often for some diseases, based on your risk and your co-morbidities (chronic disease you are already diagnosed with). Preventive services for you include:  - Medicare offers their members a free annual wellness visit, which is time for you and your primary care provider to discuss and plan for your preventive service needs. Take advantage of this benefit every year!  -All adults over the age of 72 should receive the recommended pneumonia vaccines. Current USPSTF guidelines recommend a series of two vaccines for the best pneumonia protection.   -All adults should have a flu vaccine yearly and a tetanus vaccine every 10 years.   -All adults age 48 and older should receive the shingles vaccines (series of two vaccines).       -All adults age 38-68 who are overweight should have a diabetes screening test once every three years.   -All adults born between [birth year] and 1966 should be screened once for Hepatitis C.  -Other screening tests and preventive services for persons with diabetes include: an eye exam to screen for diabetic retinopathy, a kidney function test, a foot exam, and stricter control over your cholesterol.   -Cardiovascular screening for adults with routine risk involves an electrocardiogram (ECG) at intervals determined by your doctor.   -Colorectal cancer screenings should be done for adults age 54-65 with no increased risk factors for colorectal cancer. There are a number of acceptable methods of screening for this type of cancer. Each test has its own benefits and drawbacks. Discuss with your doctor what is most appropriate for you during your annual wellness visit. The different tests include: colonoscopy (considered the best screening method), a fecal occult blood test, a fecal DNA test, and sigmoidoscopy.    -A bone mass density test is recommended when a woman turns 65 to screen for osteoporosis. This test is only recommended one time, as a screening. Some providers will use this same test as a disease monitoring tool if you already have osteoporosis. -Breast cancer screenings are recommended every other year for women of normal risk, age 54-69.  -Cervical cancer screenings for women over age 72 are only recommended with certain risk factors.      Here is a list of your current Health Maintenance items (your personalized list of preventive services) with a due date:  Health Maintenance Due   Topic Date Due    Eye Exam  12/09/1960    Shingles Vaccine (1 of 2) 12/09/1999    Colonoscopy  06/17/2019    Hemoglobin A1C    10/30/2019    Albumin Urine Test  03/11/2020    Mammogram  04/19/2020

## 2020-04-13 NOTE — PROGRESS NOTES
This is the Subsequent Medicare Annual Wellness Exam, performed 12 months or more after the Initial AWV or the last Subsequent AWV    Consent: Loly Cheng, who was seen by synchronous (real-time) audio-video technology, and/or her healthcare decision maker, is aware that this patient-initiated, Telehealth encounter on 4/13/2020 is a billable service. While AWVs are fully covered by Medicare, any services rendered on this date that are not included in an AWV are subject to additional billing, with coverage as determined by her insurance carrier. She is aware that she may receive a bill for any such additional services and has provided verbal consent to proceed: Yes. I have reviewed the patient's medical history in detail and updated the computerized patient record. History     Patient Active Problem List   Diagnosis Code    GERD (gastroesophageal reflux disease) K21.9    Essential hypertension I10    Mixed hyperlipidemia E78.2    Diabetes mellitus type 2, controlled ([address] E11.9    Acute emphysematous cholecystitis K81.0    Acute gallstone pancreatitis K85.10    Acute hyperglycemia R73.9    Candidal vaginitis B37.3    Contact dermatitis due to cosmetics L25.0    Foot pain M79.673    Plantar fascia syndrome M72.2     Past Medical History:   Diagnosis Date    Diabetes ([address]     Hypercholesterolemia     Hypertension       Past Surgical History:   Procedure Laterality Date    HX GYN      ectopic pregnancy x 2    HX GYN      ovarian cyst    HX TOTAL ABDOMINAL HYSTERECTOMY  1/1/1980    early 80's     Current Outpatient Medications   Medication Sig Dispense Refill    azelastine (ASTELIN) 137 mcg (0.1 %) nasal spray 1 [place] by Both Nostrils route two (2) times a day. Use in each nostril as directed 3 Bottle 1    atorvastatin (LIPITOR) 10 mg tablet TAKE 1 TABLET EVERY DAY 90 Tab 1    sertraline (ZOLOFT) 25 mg tablet Take 1 Tab by mouth daily.  90 Tab 1    hydroCHLOROthiazide (HYDRODIURIL) 25 mg tablet TAKE ONE TABLET BY MOUTH ONCE DAILY *NEEDS OFFICE VISIT FOR BLOOD PRESSURE CHECK* 90 Tab 1    losartan (COZAAR) 50 mg tablet TAKE 1 TABLET EVERY DAY 90 Tab 1    metFORMIN ER (GLUCOPHAGE XR) 500 mg tablet Take 2 Tabs by mouth daily (with dinner). 180 Tab 1    TRUE METRIX GLUCOSE METER misc   0    glucose blood VI test strips (ASCENSIA AUTODISC VI, ONE TOUCH ULTRA TEST VI) strip Please test two times daily 100 Strip 3    Blood-Glucose Meter monitoring kit Test Once Daily in morning before breakfast \"E11.9\" 1 Kit 0    esomeprazole (NEXIUM) 40 mg capsule TAKE 1 CAPSULE BY MOUTH ONCE DAILY 30 Cap 1    calcium carbonate (TUMS) 200 mg calcium (500 mg) chew Take 1 Tab by mouth daily. Allergies   Allergen Reactions    Paxil [Paroxetine Hcl] Other (comments)     Made patient feel jittery. Family History   Problem Relation Age of Onset    Diabetes Mother     Hypertension Mother     Cancer Brother     Cancer Other     Breast Cancer Sister         over 48     Social History     Tobacco Use    Smoking status: Former Smoker     Packs/day: 0.25     Years: 10.00     Pack years: 2.50     Last attempt to quit: 2014     Years since quittin.7    Smokeless tobacco: Never Used   Substance Use Topics    Alcohol use: Yes     Alcohol/week: 0.8 standard drinks     Types: 1 Cans of beer per week     Comment: once weekly       Depression Risk Factor Screening:     3 most recent PHQ Screens 2019   Little interest or pleasure in doing things Not at all   Feeling down, depressed, irritable, or hopeless Not at all   Total Score PHQ 2 0       Alcohol Risk Factor Screening:   Do you average 1 drink per night or more than 7 drinks a week:  No    On any one occasion in the past three months have you have had more than 3 drinks containing alcohol:  No      Functional Ability and Level of Safety:   Hearing: Hearing is good. Activities of Daily Living: The home contains: no safety equipment.   Patient does total self care    Ambulation: with no difficulty    Fall Risk:  Fall Risk Assessment, last 12 mths 6/24/2019   Able to walk? Yes   Fall in past 12 months? No       Abuse Screen:  Patient is not abused    Cognitive Screening   Has your family/caregiver stated any concerns about your memory: no      Patient Care Team   Patient Care Team:  Leighann Edward [facility]-C [facility] Empaneled Provider    Assessment/Plan   Education and counseling provided:  Are appropriate based on today's review and evaluation  Screening Mammography  Patient reports that she is due to have her mammogram done and to have her eyes examined. She reports that she will be following up with both of these appointments once the COVID-19 19 is no longer a concern. Patient will be calling back with the name of the ophthalmologist that she saw last year for her eye exam.  She states she believes his name is Dr. Talya Sanchez but she is not sure. The patient reports that she plans on returning back to the eye doctor that is located at the [facility] on [address]  Diagnoses and all orders for this visit:    1. Medicare annual wellness visit, subsequent    2. Environmental and seasonal allergies  -     azelastine (ASTELIN) 137 mcg (0.1 %) nasal spray; 1 [place] by Both Nostrils route two (2) times a day. Use in each nostril as directed        Health Maintenance Due   Topic Date Due    Eye Exam Retinal or Dilated  12/09/1960    Shingrix Vaccine Age 50> (1 of 2) 12/09/1999    Colonoscopy  06/17/2019    A1C test (Diabetic or Prediabetic)  10/30/2019    Medicare Yearly Exam  03/08/2020    Foot Exam Q1  03/08/2020    MICROALBUMIN Q1  03/11/2020    Breast Cancer Screen Mammogram  04/19/2020       Vanessa Sharpe is a 78 y.o. female being evaluated by a video visit encounter for concerns as above. A caregiver was present when appropriate.  Due to this being a TeleHealth encounter (During BQMKW-98 public health emergency), evaluation of the following organ systems was limited: Vitals/Constitutional/EENT/Resp/CV/GI//MS/Neuro/Skin/Heme-Lymph-Imm. Pursuant to the emergency declaration under the [facility], [facility] waiver authority and the [employer] Act, this Virtual  Visit was conducted, with patient's (and/or legal guardian's) consent, to reduce the patient's risk of exposure to COVID-19 and provide necessary medical care. Services were provided through a video synchronous discussion virtually to substitute for in-person clinic visit. Patient and provider were located at their individual homes.     Ninfa Ortiz PA-C

## 2020-06-25 ENCOUNTER — VIRTUAL VISIT (OUTPATIENT)
Dept: INTERNAL MEDICINE CLINIC | Age: 70
End: 2020-06-25

## 2020-06-25 DIAGNOSIS — U07.1 COVID-19: Primary | ICD-10-CM

## 2020-06-25 NOTE — PROGRESS NOTES
Christiano Walker is a 78 y.o. female evaluated via audio only technology on 6/25/2020. Consent: She and/or her health care decision maker is aware that she may receive a bill for this audio only encounter, depending on her insurance coverage, and has provided verbal consent to proceed: Yes    I communicated with the patient and/or health care decision maker about the nature and details of the following:  Assessment & Plan:   Diagnoses and all orders for this visit:    1. COVID-19    Advised the patient the most important thing is to stay well-hydrated, try to eat some food and to rest.  I advised her to listen to her body and to hold on strenuous activities. Patient has shared that she got her floors on Monday. Also I explained to the patient that if she does develop increase of cough with shortness of breath and chest pain she should go immediately to be evaluated. Also reminded her that the 14-day quarantine is just a guideline because if she still having symptoms she should remain in quarantine until symptoms subside. 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 I have asked the patient to contact me next week to let me know how she is doing. 12  Subjective:   Christiano Walker is a 78 y.o. female who was seen for Concern For COVID-19 (Coronavirus)  Patient reports a follow-up on COVID-19 positive test.  She reports on Aga 15 she was diagnosed with COVID-19. She reports before getting tested she was having symptoms initially that she thought was related to her sinuses. She then started to feel horrible and developed flulike symptoms. Initially she had gone to the ER but was not tested there and was directed to other facilities. She was tested at [facility] and the test came back positive. Reports that she has been having some intermittent vomiting with the last time being yesterday. She reports she has a little cough with some phlegm at times.   Reports overall she is starting to feel better. The patient denies having shortness of breath and states she is not taking her temperature today but the last time she took it it was just a low-grade. The patient is not sure if she was exposed from her client that had visit with relatives in [place] or if she was exposed when her grandkids came to visit her from [facility]. Prior to Admission medications    Medication Sig Start Date End Date Taking? Authorizing Provider   azelastine (ASTELIN) 137 mcg (0.1 %) nasal spray 1 [place] by Both Nostrils route two (2) times a day. Use in each nostril as directed  Patient taking differently: 1 Spray by Both Nostrils route two (2) times daily as needed. Use in each nostril as directed 4/13/20  Yes Molly Ortiz PA-C   atorvastatin (LIPITOR) 10 mg tablet TAKE 1 TABLET EVERY DAY 4/2/20  Yes Molly Ortiz PA-C   sertraline (ZOLOFT) 25 mg tablet Take 1 Tab by mouth daily. 4/2/20  Yes Molly Ortiz PA-C   hydroCHLOROthiazide (HYDRODIURIL) 25 mg tablet TAKE ONE TABLET BY MOUTH ONCE DAILY *NEEDS OFFICE VISIT FOR BLOOD PRESSURE CHECK* 4/2/20  Yes Molly Ortiz PA-C   losartan (COZAAR) 50 mg tablet TAKE 1 TABLET EVERY DAY 4/2/20  Yes Molly Ortiz PA-C   metFORMIN ER (GLUCOPHAGE XR) 500 mg tablet Take 2 Tabs by mouth daily (with dinner). 4/2/20  Yes Molly Ortiz PA-C   TRUE [facility] GLUCOSE METER [facility]  4/16/19  Yes Provider, Historical   Blood-Glucose Meter monitoring kit Test Once Daily in morning before breakfast \"E11.9\" 4/16/19  Yes Molly Ortiz PA-C   calcium carbonate (TUMS) 200 mg calcium (500 mg) chew Take 1 Tab by mouth daily.    Yes Provider, Historical   glucose blood VI test strips (ASCENSIA AUTODISC VI, ONE TOUCH ULTRA TEST VI) strip Please test two times daily 4/18/19   Molly Ortiz PA-C   esomeprazole (NEXIUM) 40 mg capsule TAKE 1 CAPSULE BY MOUTH ONCE DAILY 5/29/18   Ruthann Ortiz PA-C     Allergies   Allergen Reactions    Paxil [Paroxetine Hcl] Other (comments)     Made patient feel jittery. Patient Active Problem List    Diagnosis Date Noted    Acute emphysematous cholecystitis 04/10/2020    Acute gallstone pancreatitis 04/10/2020    Acute hyperglycemia 04/10/2020    Candidal vaginitis 04/10/2020    Contact dermatitis due to cosmetics 04/10/2020    Foot pain 04/10/2020    Plantar fascia syndrome 04/10/2020    Essential hypertension 06/21/2016    Mixed hyperlipidemia 06/21/2016    Diabetes mellitus type 2, controlled ([facility] 06/21/2016    GERD (gastroesophageal reflux disease) 02/25/2014     Current Outpatient Medications   Medication Sig Dispense Refill    azelastine (ASTELIN) 137 mcg (0.1 %) nasal spray 1 [place] by Both Nostrils route two (2) times a day. Use in each nostril as directed (Patient taking differently: 1 Spray by Both Nostrils route two (2) times daily as needed. Use in each nostril as directed) 3 Bottle 1    atorvastatin (LIPITOR) 10 mg tablet TAKE 1 TABLET EVERY DAY 90 Tab 1    sertraline (ZOLOFT) 25 mg tablet Take 1 Tab by mouth daily. 90 Tab 1    hydroCHLOROthiazide (HYDRODIURIL) 25 mg tablet TAKE ONE TABLET BY MOUTH ONCE DAILY *NEEDS OFFICE VISIT FOR BLOOD PRESSURE CHECK* 90 Tab 1    losartan (COZAAR) 50 mg tablet TAKE 1 TABLET EVERY DAY 90 Tab 1    metFORMIN ER (GLUCOPHAGE XR) 500 mg tablet Take 2 Tabs by mouth daily (with dinner). 180 Tab 1    TRUE METRIX GLUCOSE METER misc   0    Blood-Glucose Meter monitoring kit Test Once Daily in morning before breakfast \"E11.9\" 1 Kit 0    calcium carbonate (TUMS) 200 mg calcium (500 mg) chew Take 1 Tab by mouth daily.  glucose blood VI test strips (ASCENSIA AUTODISC VI, ONE TOUCH ULTRA TEST VI) strip Please test two times daily 100 Strip 3    esomeprazole (NEXIUM) 40 mg capsule TAKE 1 CAPSULE BY MOUTH ONCE DAILY 30 Cap 1     Allergies   Allergen Reactions    Paxil [Paroxetine Hcl] Other (comments)     Made patient feel jittery.         ROS  See above  I affirm this is a Patient-Initiated Episode with a Patient who has not had a related appointment within my department in the past 7 days or scheduled within the next 24 hours.     Total Time: minutes: 21-30 minutes    Note: not billable if this call serves to triage the patient into an appointment for the relevant concern      Molly Ortiz PA-C

## 2020-06-25 NOTE — PROGRESS NOTES
Tested for COVID on Aga 15, 2020-positive. Patient states having occasional cough, taste and smell slowly coming back, vomited some water yesterday and weakness getting better. Patient following up the recent COVID symptoms. Patient experiencing some nausea on and off.

## 2020-06-30 ENCOUNTER — TELEPHONE (OUTPATIENT)
Dept: INTERNAL MEDICINE CLINIC | Age: 70
End: 2020-06-30

## 2020-06-30 NOTE — TELEPHONE ENCOUNTER
Writer contacted to give instructions referring to her covid symptoms and use of Astelin spray per Gerardo Doyle, patient verbalized understanding.

## 2020-06-30 NOTE — TELEPHONE ENCOUNTER
Sometime today when you get a chance can you check on this patient to make sure she is doing okay with the Covid-19 thanks

## 2020-06-30 NOTE — TELEPHONE ENCOUNTER
Please advise the patient that she should remain in quarantine. until symptoms resolve or she have two consecutive negative test.  She should not be at the grocery store or bank. Okay to use astelin.  thanks

## 2020-06-30 NOTE — TELEPHONE ENCOUNTER
Called and spoke with patient she states today is the best day so far. She has a little stomach pain and eye pain, but better than is was. Taste is coming and going, but eating and keeping it down, patient has lost about 5-6lbs. She states having to get out of the house and went to [facility] yesterday for groceries  And [address] [facility] [address] today for the bank, felt good to get out. Patient plans on going back to work 7/7, patient would also like to know if she can use her Astelin spray now.

## 2020-07-24 ENCOUNTER — TELEPHONE (OUTPATIENT)
Dept: INTERNAL MEDICINE CLINIC | Age: 70
End: 2020-07-24

## 2020-07-24 LAB
ALB/GLOBRATIO, 58C: 1.5 (CALC) (ref 1–2.5)
ALBUMIN SERPL-MCNC: 4.5 G/DL (ref 3.6–5.1)
ALKALINE PHOSPHATASE, TOTAL, 25002000: 86 U/L (ref 37–153)
ALT SERPL-CCNC: 15 U/L (ref 6–29)
AST SERPL W P-5'-P-CCNC: 13 U/L (ref 10–35)
BASOPHILS # BLD: 34 CELLS/UL (ref 0–200)
BASOPHILS NFR BLD: 0.5 %
BILIRUB SERPL-MCNC: 0.5 MG/DL (ref 0.2–1.2)
BUN SERPL-MCNC: 15 MG/DL (ref 7–25)
BUN/CREATININE RATIO,BUCR: ABNORMAL (CALC) (ref 6–22)
CALCIUM SERPL-MCNC: 9.5 MG/DL (ref 8.6–10.4)
CHLORIDE SERPL-SCNC: 98 MMOL/L (ref 98–110)
CHOL/HDL RATIO,CHHDX: 3.5 (CALC)
CHOLEST SERPL-MCNC: 180 MG/DL
CO2 SERPL-SCNC: 27 MMOL/L (ref 20–32)
CREAT SERPL-MCNC: 0.71 MG/DL (ref 0.6–0.93)
CREATININE URINE,9612018: 60 MG/DL (ref 20–275)
EAG (MG/DL),9916804: ABNORMAL (CALC)
EOSINOPHIL # BLD: 13 CELLS/UL (ref 15–500)
EOSINOPHIL NFR BLD: 0.2 %
ERYTHROCYTE [DISTWIDTH] IN BLOOD BY AUTOMATED COUNT: 12.2 % (ref 11–15)
GLOBULIN,GLOB: 3.1 G/DL (CALC) (ref 1.9–3.7)
GLUCOSE SERPL-MCNC: 426 MG/DL (ref 65–99)
HBA1C MFR BLD HPLC: >14 % OF TOTAL HGB
HCT VFR BLD AUTO: 41 % (ref 35–45)
HDLC SERPL-MCNC: 51 MG/DL
HGB BLD-MCNC: 13.4 G/DL (ref 11.7–15.5)
LDL-CHOLESTEROL: 109 MG/DL (CALC)
LYMPHOCYTES # BLD: 1608 CELLS/UL (ref 850–3900)
LYMPHOCYTES NFR BLD: 24 %
MCH RBC QN AUTO: 30.7 PG (ref 27–33)
MCHC RBC AUTO-ENTMCNC: 32.7 G/DL (ref 32–36)
MCV RBC AUTO: 93.8 FL (ref 80–100)
MICROALBUMIN,URINE RANDOM 140054: 1.8 MG/DL
MICROALBUMIN/CREAT RATIO: 30 MCG/MG CREAT
MONOCYTES # BLD: 402 CELLS/UL (ref 200–950)
MONOCYTES NFR BLD: 6 %
NEUTROPHILS # BLD AUTO: 4643 CELLS/UL (ref 1500–7800)
NEUTROPHILS # BLD: 69.3 %
NON-HDL CHOLESTEROL, 011976: 129 MG/DL (CALC)
PLATELET # BLD AUTO: 233 THOUSAND/UL (ref 140–400)
PMV BLD AUTO: 10.7 FL (ref 7.5–12.5)
POTASSIUM SERPL-SCNC: 4.1 MMOL/L (ref 3.5–5.3)
PROT SERPL-MCNC: 7.6 G/DL (ref 6.1–8.1)
RBC # BLD AUTO: 4.37 MILLION/UL (ref 3.8–5.1)
SODIUM SERPL-SCNC: 133 MMOL/L (ref 135–146)
TRIGL SERPL-MCNC: 100 MG/DL (ref ?–150)
WBC # BLD AUTO: 6.7 THOUSAND/UL (ref 3.8–10.8)

## 2020-07-24 NOTE — TELEPHONE ENCOUNTER
I called the patient to go over labs. Left a message that I would try to call her back in 10 minutes to go over labs.

## 2020-07-24 NOTE — PROGRESS NOTES
Writer spoke with patient, gave lab results and inquiry per Claude Jean-Baptiste, patient verbalized understanding, patient stated she stopped her meds during when she was sick, but is back on her diabetes meds now and will watch what she eats.

## 2020-07-24 NOTE — PROGRESS NOTES
Please let the patient know her glucose is extremely elevated (dangerously) she has got to get back on her diabetes medication immediately and she has to start watching her diet. Has she been taking her medication at all? Other labs are okay.

## 2020-09-29 ENCOUNTER — OFFICE VISIT (OUTPATIENT)
Dept: INTERNAL MEDICINE CLINIC | Age: 70
End: 2020-09-29
Payer: MEDICARE

## 2020-09-29 VITALS
DIASTOLIC BLOOD PRESSURE: 73 MMHG | RESPIRATION RATE: 20 BRPM | TEMPERATURE: 98.1 F | HEART RATE: 74 BPM | WEIGHT: 170 LBS | OXYGEN SATURATION: 98 % | BODY MASS INDEX: 30.12 KG/M2 | HEIGHT: 63 IN | SYSTOLIC BLOOD PRESSURE: 143 MMHG

## 2020-09-29 DIAGNOSIS — I10 ESSENTIAL HYPERTENSION: ICD-10-CM

## 2020-09-29 DIAGNOSIS — E11.9 COMPREHENSIVE DIABETIC FOOT EXAMINATION, TYPE 2 DM, ENCOUNTER FOR (HCC): ICD-10-CM

## 2020-09-29 DIAGNOSIS — R21 RASH: Primary | ICD-10-CM

## 2020-09-29 DIAGNOSIS — E78.5 HYPERLIPIDEMIA, UNSPECIFIED HYPERLIPIDEMIA TYPE: ICD-10-CM

## 2020-09-29 DIAGNOSIS — Z23 ENCOUNTER FOR IMMUNIZATION: ICD-10-CM

## 2020-09-29 DIAGNOSIS — E11.9 TYPE 2 DIABETES MELLITUS WITHOUT COMPLICATION, WITHOUT LONG-TERM CURRENT USE OF INSULIN (HCC): ICD-10-CM

## 2020-09-29 PROCEDURE — G9899 SCRN MAM PERF RSLTS DOC: HCPCS | Performed by: PHYSICIAN ASSISTANT

## 2020-09-29 PROCEDURE — 2022F DILAT RTA XM EVC RTNOPTHY: CPT | Performed by: PHYSICIAN ASSISTANT

## 2020-09-29 PROCEDURE — G8417 CALC BMI ABV UP PARAM F/U: HCPCS | Performed by: PHYSICIAN ASSISTANT

## 2020-09-29 PROCEDURE — G8753 SYS BP > OR = 140: HCPCS | Performed by: PHYSICIAN ASSISTANT

## 2020-09-29 PROCEDURE — 1090F PRES/ABSN URINE INCON ASSESS: CPT | Performed by: PHYSICIAN ASSISTANT

## 2020-09-29 PROCEDURE — G8432 DEP SCR NOT DOC, RNG: HCPCS | Performed by: PHYSICIAN ASSISTANT

## 2020-09-29 PROCEDURE — G8427 DOCREV CUR MEDS BY ELIG CLIN: HCPCS | Performed by: PHYSICIAN ASSISTANT

## 2020-09-29 PROCEDURE — G8536 NO DOC ELDER MAL SCRN: HCPCS | Performed by: PHYSICIAN ASSISTANT

## 2020-09-29 PROCEDURE — 99214 OFFICE O/P EST MOD 30 MIN: CPT | Performed by: PHYSICIAN ASSISTANT

## 2020-09-29 PROCEDURE — G0008 ADMIN INFLUENZA VIRUS VAC: HCPCS

## 2020-09-29 PROCEDURE — 90694 VACC AIIV4 NO PRSRV 0.5ML IM: CPT

## 2020-09-29 PROCEDURE — 3046F HEMOGLOBIN A1C LEVEL >9.0%: CPT | Performed by: PHYSICIAN ASSISTANT

## 2020-09-29 PROCEDURE — 3017F COLORECTAL CA SCREEN DOC REV: CPT | Performed by: PHYSICIAN ASSISTANT

## 2020-09-29 PROCEDURE — G8754 DIAS BP LESS 90: HCPCS | Performed by: PHYSICIAN ASSISTANT

## 2020-09-29 PROCEDURE — 1101F PT FALLS ASSESS-DOCD LE1/YR: CPT | Performed by: PHYSICIAN ASSISTANT

## 2020-09-29 PROCEDURE — G8399 PT W/DXA RESULTS DOCUMENT: HCPCS | Performed by: PHYSICIAN ASSISTANT

## 2020-09-29 RX ORDER — SELENIUM SULFIDE 2.5 MG/100ML
LOTION TOPICAL
Qty: 118 ML | Refills: 0 | Status: SHIPPED | OUTPATIENT
Start: 2020-09-29

## 2020-09-29 NOTE — PATIENT INSTRUCTIONS
Vaccine Information Statement    Influenza (Flu) Vaccine (Inactivated or Recombinant): What You Need to Know    Many Vaccine Information Statements are available in [place]ian and other languages. See www.immunize.org/vis  Hojas de información sobre vacunas están disponibles en español y en muchos otros idiomas. Visite www.immunize.org/vis    1. Why get vaccinated? Influenza vaccine can prevent influenza (flu). Flu is a contagious disease that spreads around the [place] [facility] every year, usually between October and May. Anyone can get the flu, but it is more dangerous for some people. Infants and young children, people 72years of age and older, pregnant women, and people with certain health conditions or a weakened immune system are at greatest risk of flu complications. Pneumonia, bronchitis, sinus infections and ear infections are examples of flu-related complications. If you have a medical condition, such as heart disease, cancer or diabetes, flu can make it worse. Flu can cause fever and chills, sore throat, muscle aches, fatigue, cough, headache, and runny or stuffy nose. Some people may have vomiting and diarrhea, though this is more common in children than adults. Each year thousands of people in the [facility] die from flu, and many more are hospitalized. Flu vaccine prevents millions of illnesses and flu-related visits to the doctor each year. 2. Influenza vaccines     CDC recommends everyone 10months of age and older get vaccinated every flu season. Children 6 months through 6years of age may need 2 doses during a single flu season. Everyone else needs only 1 dose each flu season. It takes about 2 weeks for protection to develop after vaccination. There are many flu viruses, and they are always changing. Each year a new flu vaccine is made to protect against three or four viruses that are likely to cause disease in the upcoming flu season.  Even when the vaccine doesnt exactly match these viruses, it may still provide some protection. Influenza vaccine does not cause flu. Influenza vaccine may be given at the same time as other vaccines. 3. Talk with your health care provider    Tell your vaccine provider if the person getting the vaccine:   Has had an allergic reaction after a previous dose of influenza vaccine, or has any severe, life-threatening allergies.  Has ever had Guillain-Barré Syndrome (also called GBS). In some cases, your health care provider may decide to postpone influenza vaccination to a future visit. People with minor illnesses, such as a cold, may be vaccinated. People who are moderately or severely ill should usually wait until they recover before getting influenza vaccine. Your health care provider can give you more information. 4. Risks of a reaction     Soreness, redness, and swelling where shot is given, fever, muscle aches, and headache can happen after influenza vaccine.  There may be a very small increased risk of Guillain-Barré Syndrome (GBS) after inactivated influenza vaccine (the flu shot). Wily Current children who get the flu shot along with pneumococcal vaccine (PCV13), and/or DTaP vaccine at the same time might be slightly more likely to have a seizure caused by fever. Tell your health care provider if a child who is getting flu vaccine has ever had a seizure. People sometimes faint after medical procedures, including vaccination. Tell your provider if you feel dizzy or have vision changes or ringing in the ears. As with any medicine, there is a very remote chance of a vaccine causing a severe allergic reaction, other serious injury, or death. 5. What if there is a serious problem? An allergic reaction could occur after the vaccinated person leaves the clinic.  If you see signs of a severe allergic reaction (hives, swelling of the face and throat, difficulty breathing, a fast heartbeat, dizziness, or weakness), call 9-1-1 and get the person to the nearest hospital.    For other signs that concern you, call your health care provider. Adverse reactions should be reported to the Vaccine Adverse Event Reporting System (VAERS). Your health care provider will usually file this report, or you can do it yourself. Visit the VAERS website at www.vaers. [facility].gov or call 1-937.995.4095. VAERS is only for reporting reactions, and VAERS staff do not give medical advice. 6. The National Vaccine Injury Compensation Program    The formerly [facility] Vaccine Injury Compensation Program (VICP) is a federal program that was created to compensate people who may have been injured by certain vaccines. Visit the VICP website at www.[facility].gov/vaccinecompensation or call 8-217.581.8404 to learn about the program and about filing a claim. There is a time limit to file a claim for compensation. 7. How can I learn more?  Ask your health care provider.  Call your local or state health department.  Contact the Centers for Disease Control and Prevention (CDC):  - Call 1-860.978.6569 (5-802-BVH-INFO) or  - Visit CDCs influenza website at www.cdc.gov/flu    Vaccine Information Statement (Interim)  Inactivated Influenza Vaccine   8/15/2019  42 BRANDI Arevalo [id number]-05   Department of Health and Human Services  Centers for Disease Control and Prevention    Office Use Only

## 2020-09-29 NOTE — PROGRESS NOTES
Patient will get flu shot. Paitent c/o skin discoloration on bilateral legs and both arms, patient states worse since she had the covid virus.

## 2020-09-30 RX ORDER — LOSARTAN POTASSIUM 50 MG/1
TABLET ORAL
Qty: 90 TAB | Refills: 1 | Status: SHIPPED | OUTPATIENT
Start: 2020-09-30 | End: 2021-01-19 | Stop reason: SDUPTHER

## 2020-09-30 RX ORDER — HYDROCHLOROTHIAZIDE 25 MG/1
TABLET ORAL
Qty: 90 TAB | Refills: 1 | Status: SHIPPED | OUTPATIENT
Start: 2020-09-30 | End: 2021-01-19 | Stop reason: SDUPTHER

## 2020-09-30 RX ORDER — ATORVASTATIN CALCIUM 10 MG/1
TABLET, FILM COATED ORAL
Qty: 90 TAB | Refills: 1 | Status: SHIPPED | OUTPATIENT
Start: 2020-09-30 | End: 2021-01-19 | Stop reason: SDUPTHER

## 2020-09-30 RX ORDER — METFORMIN HYDROCHLORIDE 500 MG/1
2000 TABLET, EXTENDED RELEASE ORAL
Qty: 360 TAB | Refills: 1 | Status: SHIPPED | OUTPATIENT
Start: 2020-09-30 | End: 2021-01-19 | Stop reason: SDUPTHER

## 2020-09-30 NOTE — PROGRESS NOTES
Chief Complaint   Patient presents with    Skin Problem     she is a 78y.o. year old female who presents for evaluation. Patient presents for evaluation of rash on her legs and arms. She reports that when she was diagnosed with COVID-19 she noticed that she had a rash on her arms. She reports that the rash at that time was more of the darker pigmentation rash that was located on both of her arms but at that time was not located on her legs. Since then the rash on her arms have lightened but now she is noticed a rash on both of her lower legs. She reports that the rash is not itchy and she has not tried using anything on it. The patient was diagnosed with COVID back in June. Reviewed and agree with Nurse Note and duplicated in this note. Reviewed PmHx, RxHx, FmHx, SocHx, AllgHx and updated and dated in the chart. Review of Systems - negative except as listed above    Objective:     Vitals:    09/29/20 1051   BP: (!) 143/73   Pulse: 74   Resp: 20   Temp: 98.1 °F (36.7 °C)   TempSrc: Oral   SpO2: 98%   Weight: 170 lb (77.1 kg)   Height: 5' 3\" (1.6 m)     Physical Examination: General appearance - alert, well appearing, and in no distress  Mental status - normal mood, behavior, speech, dress, motor activity, and thought processes  Skin -patchy areas of hyperpigmentation noted of arms and lower legs. Rashes not seen on the chest, abdomen, back, or upper legs  Feet- normal pulses, sensation is intact, no flaking or scaling noted. Assessment/ Plan:   Diagnoses and all orders for this visit:    1. Rash  -     selenium sulfide 2.5 % lotion; Apply thin layer to the skin daily for 7 days  Indications: a fungal infection of the skin called tinea versicolor    2. Type 2 diabetes mellitus without complication, without long-term current use of insulin (HCC)  -     metFORMIN ER (GLUCOPHAGE XR) 500 mg tablet; Take 4 Tabs by mouth daily (with dinner).     3. Encounter for immunization  -     FLU (FLUAD QUAD INFLUENZA VACCINE,QUAD,ADJUVANTED)    4. Comprehensive diabetic foot examination, type 2 DM, encounter for ([facility]  -      DIABETES FOOT EXAM    5. Essential hypertension  -     hydroCHLOROthiazide (HYDRODIURIL) 25 mg tablet; TAKE ONE TABLET BY MOUTH ONCE DAILY  -     losartan (COZAAR) 50 mg tablet; TAKE 1 TABLET EVERY DAY    6. Hyperlipidemia, unspecified hyperlipidemia type  -     atorvastatin (LIPITOR) 10 mg tablet; TAKE 1 TABLET EVERY DAY    Had patient to briefly be seen by Dr. Darlynn Hamman as well. Patient will be treated for possible tinea versicolor. Patient has been instructed to contact the office if rash is not improving or worsen. At that time the patient will need to see a dermatologist.  The patient I spoke about her glucose readings at home. Based on what she shared numbers are still not at goal and I would like for her to increase her metformin to 2000 mg with dinner. I advised the patient to do 1500 mg for a week and then increase to the 2000 mg. If the patient is not able to tolerate that she is to let me know. Also remind the patient that it is important to try to get regular exercise. Patient shared that it is difficult to get exercise due to the COVID-19. She will return at her regular scheduled time for recheck of her diabetes. Total encounter time was 25 minutes; over 50% of the time was spent counseling and coordinating care regarding rash on extremities and adjusting medication for her diabetes. I have discussed the diagnosis with the patient and the intended plan as seen in the above orders. The patient has received an after-visit summary and questions were answered concerning future plans.      Medication Side Effects and Warnings were discussed with patient: yes  Patient Labs were reviewed and or requested: n/a  Patient Past Records were reviewed and or requested  yes    Molly Ortiz PA-C

## 2020-10-12 ENCOUNTER — TELEPHONE (OUTPATIENT)
Dept: INTERNAL MEDICINE CLINIC | Age: 70
End: 2020-10-12

## 2020-10-13 DIAGNOSIS — F32.A DEPRESSION, UNSPECIFIED DEPRESSION TYPE: ICD-10-CM

## 2020-10-14 ENCOUNTER — TELEPHONE (OUTPATIENT)
Dept: INTERNAL MEDICINE CLINIC | Age: 70
End: 2020-10-14

## 2020-10-14 DIAGNOSIS — E11.9 TYPE 2 DIABETES MELLITUS WITHOUT COMPLICATION, WITHOUT LONG-TERM CURRENT USE OF INSULIN (HCC): Primary | ICD-10-CM

## 2020-10-14 RX ORDER — SERTRALINE HYDROCHLORIDE 25 MG/1
25 TABLET, FILM COATED ORAL DAILY
Qty: 90 TAB | Refills: 1 | Status: SHIPPED | OUTPATIENT
Start: 2020-10-14 | End: 2021-01-19 | Stop reason: SDUPTHER

## 2020-10-14 RX ORDER — LANCETS
EACH MISCELLANEOUS
Qty: 100 EACH | Refills: 11 | Status: SHIPPED | OUTPATIENT
Start: 2020-10-14 | End: 2020-10-19 | Stop reason: SDUPTHER

## 2020-10-19 DIAGNOSIS — E11.9 TYPE 2 DIABETES MELLITUS WITHOUT COMPLICATION, WITHOUT LONG-TERM CURRENT USE OF INSULIN (HCC): ICD-10-CM

## 2020-10-19 RX ORDER — LANCETS
EACH MISCELLANEOUS
Qty: 100 EACH | Refills: 11 | Status: SHIPPED | OUTPATIENT
Start: 2020-10-19 | End: 2020-10-28 | Stop reason: SDUPTHER

## 2020-10-28 ENCOUNTER — TELEPHONE (OUTPATIENT)
Dept: INTERNAL MEDICINE CLINIC | Age: 70
End: 2020-10-28

## 2020-10-28 DIAGNOSIS — E11.9 TYPE 2 DIABETES MELLITUS WITHOUT COMPLICATION, WITHOUT LONG-TERM CURRENT USE OF INSULIN (HCC): Primary | ICD-10-CM

## 2020-10-28 RX ORDER — IBUPROFEN 200 MG
CAPSULE ORAL
Qty: 100 STRIP | Refills: 3 | Status: SHIPPED | OUTPATIENT
Start: 2020-10-28 | End: 2021-06-28 | Stop reason: SDUPTHER

## 2020-10-28 RX ORDER — LANCETS
EACH MISCELLANEOUS
Qty: 100 EACH | Refills: 11 | Status: SHIPPED | OUTPATIENT
Start: 2020-10-28 | End: 2021-01-19 | Stop reason: SDUPTHER

## 2020-11-02 ENCOUNTER — TELEPHONE (OUTPATIENT)
Dept: INTERNAL MEDICINE CLINIC | Age: 70
End: 2020-11-02

## 2020-11-02 DIAGNOSIS — E11.9 TYPE 2 DIABETES MELLITUS WITHOUT COMPLICATION, WITHOUT LONG-TERM CURRENT USE OF INSULIN (HCC): Primary | ICD-10-CM

## 2020-11-02 RX ORDER — ISOPROPYL ALCOHOL 70 ML/100ML
SWAB TOPICAL
Qty: 200 PAD | Refills: 3 | Status: SHIPPED | OUTPATIENT
Start: 2020-11-02 | End: 2021-01-19 | Stop reason: SDUPTHER

## 2020-11-16 ENCOUNTER — HOSPITAL ENCOUNTER (OUTPATIENT)
Dept: MAMMOGRAPHY | Age: 70
Discharge: HOME | End: 2020-11-16
Attending: PHYSICIAN ASSISTANT
Payer: MEDICARE

## 2020-11-16 DIAGNOSIS — Z12.31 VISIT FOR SCREENING MAMMOGRAM: ICD-10-CM

## 2020-11-16 PROCEDURE — 77067 SCR MAMMO BI INCL CAD: CPT

## 2021-01-21 ENCOUNTER — VIRTUAL VISIT (OUTPATIENT)
Dept: INTERNAL MEDICINE CLINIC | Age: 71
End: 2021-01-21
Payer: MEDICARE

## 2021-01-21 DIAGNOSIS — I10 ESSENTIAL HYPERTENSION: ICD-10-CM

## 2021-01-21 DIAGNOSIS — E11.9 TYPE 2 DIABETES MELLITUS WITHOUT COMPLICATION, WITHOUT LONG-TERM CURRENT USE OF INSULIN (HCC): Primary | ICD-10-CM

## 2021-01-21 DIAGNOSIS — F32.A DEPRESSION, UNSPECIFIED DEPRESSION TYPE: ICD-10-CM

## 2021-01-21 DIAGNOSIS — E78.5 HYPERLIPIDEMIA, UNSPECIFIED HYPERLIPIDEMIA TYPE: ICD-10-CM

## 2021-01-21 PROCEDURE — G8536 NO DOC ELDER MAL SCRN: HCPCS | Performed by: PHYSICIAN ASSISTANT

## 2021-01-21 PROCEDURE — 1101F PT FALLS ASSESS-DOCD LE1/YR: CPT | Performed by: PHYSICIAN ASSISTANT

## 2021-01-21 PROCEDURE — 3017F COLORECTAL CA SCREEN DOC REV: CPT | Performed by: PHYSICIAN ASSISTANT

## 2021-01-21 PROCEDURE — 99214 OFFICE O/P EST MOD 30 MIN: CPT | Performed by: PHYSICIAN ASSISTANT

## 2021-01-21 PROCEDURE — G8427 DOCREV CUR MEDS BY ELIG CLIN: HCPCS | Performed by: PHYSICIAN ASSISTANT

## 2021-01-21 PROCEDURE — G8417 CALC BMI ABV UP PARAM F/U: HCPCS | Performed by: PHYSICIAN ASSISTANT

## 2021-01-21 PROCEDURE — 1090F PRES/ABSN URINE INCON ASSESS: CPT | Performed by: PHYSICIAN ASSISTANT

## 2021-01-21 PROCEDURE — G9899 SCRN MAM PERF RSLTS DOC: HCPCS | Performed by: PHYSICIAN ASSISTANT

## 2021-01-21 PROCEDURE — 2022F DILAT RTA XM EVC RTNOPTHY: CPT | Performed by: PHYSICIAN ASSISTANT

## 2021-01-21 PROCEDURE — G8399 PT W/DXA RESULTS DOCUMENT: HCPCS | Performed by: PHYSICIAN ASSISTANT

## 2021-01-21 PROCEDURE — 3046F HEMOGLOBIN A1C LEVEL >9.0%: CPT | Performed by: PHYSICIAN ASSISTANT

## 2021-01-21 PROCEDURE — G8756 NO BP MEASURE DOC: HCPCS | Performed by: PHYSICIAN ASSISTANT

## 2021-01-21 PROCEDURE — G8432 DEP SCR NOT DOC, RNG: HCPCS | Performed by: PHYSICIAN ASSISTANT

## 2021-01-21 RX ORDER — SERTRALINE HYDROCHLORIDE 25 MG/1
25 TABLET, FILM COATED ORAL DAILY
Qty: 90 TAB | Refills: 1 | Status: SHIPPED | OUTPATIENT
Start: 2021-01-21 | End: 2021-03-25 | Stop reason: SDUPTHER

## 2021-01-21 RX ORDER — LANCETS
EACH MISCELLANEOUS
Qty: 200 EACH | Refills: 11 | Status: SHIPPED | OUTPATIENT
Start: 2021-01-21 | End: 2022-01-24 | Stop reason: SDUPTHER

## 2021-01-21 RX ORDER — METFORMIN HYDROCHLORIDE 500 MG/1
2000 TABLET, EXTENDED RELEASE ORAL
Qty: 360 TAB | Refills: 1 | Status: SHIPPED | OUTPATIENT
Start: 2021-01-21 | End: 2021-03-04 | Stop reason: SINTOL

## 2021-01-21 RX ORDER — HYDROCHLOROTHIAZIDE 25 MG/1
TABLET ORAL
Qty: 90 TAB | Refills: 1 | Status: SHIPPED | OUTPATIENT
Start: 2021-01-21 | End: 2021-03-25 | Stop reason: SDUPTHER

## 2021-01-21 RX ORDER — KETOCONAZOLE 20 MG/ML
SHAMPOO TOPICAL
COMMUNITY
Start: 2020-10-26 | End: 2021-08-09 | Stop reason: SDUPTHER

## 2021-01-21 RX ORDER — LOSARTAN POTASSIUM 50 MG/1
TABLET ORAL
Qty: 90 TAB | Refills: 1 | Status: SHIPPED | OUTPATIENT
Start: 2021-01-21 | End: 2021-03-25 | Stop reason: SDUPTHER

## 2021-01-21 RX ORDER — ATORVASTATIN CALCIUM 10 MG/1
TABLET, FILM COATED ORAL
Qty: 90 TAB | Refills: 1 | Status: SHIPPED | OUTPATIENT
Start: 2021-01-21 | End: 2021-03-25 | Stop reason: SDUPTHER

## 2021-01-21 NOTE — PROGRESS NOTES
Annette Jeffrey is a 69 y.o. female who was seen by synchronous (real-time) audio-video technology on 1/21/2021 for Medication Evaluation and Medication Refill        Assessment & Plan:   Diagnoses and all orders for this visit:    1. Type 2 diabetes mellitus without complication, without long-term current use of insulin (HCC)  -     METABOLIC PANEL, COMPREHENSIVE; Future  -     HEMOGLOBIN A1C WITH EAG; Future  -     CBC WITH AUTOMATED DIFF; Future  -     lancets misc; Use as directed Please test twice daily \"E11.9\"  -     metFORMIN ER (GLUCOPHAGE XR) 500 mg tablet; Take 4 Tabs by mouth daily (with dinner). 2. Hyperlipidemia, unspecified hyperlipidemia type  -     METABOLIC PANEL, COMPREHENSIVE; Future  -     LIPID PANEL; Future  -     CBC WITH AUTOMATED DIFF; Future  -     atorvastatin (LIPITOR) 10 mg tablet; TAKE 1 TABLET EVERY DAY    3. Essential hypertension  -     METABOLIC PANEL, COMPREHENSIVE; Future  -     CBC WITH AUTOMATED DIFF; Future  -     hydroCHLOROthiazide (HYDRODIURIL) 25 mg tablet; TAKE ONE TABLET BY MOUTH ONCE DAILY  -     losartan (COZAAR) 50 mg tablet; TAKE 1 TABLET EVERY DAY    4. Depression, unspecified depression type  -     sertraline (ZOLOFT) 25 mg tablet; Take 1 Tab by mouth daily. Routine labs placed for the patient today. She will get those labs done when fasting. Her medications has been refilled. Advised the patient to try to get some extra exercise if possible. Encouraged the patient once she is able to get the COVID-19 vaccine to do so. The patient should follow-up in 3 to 4 months. I spent at least 30 minutes on this visit with this established patient. 712  Subjective:   Patient presents for follow-up. She reports overall she has been doing well. She states that back around the holidays she was 18 and was eating foods that she probably should not have had. After Leny she has now been working hard on her diet.   She reports the highest reading she has had most recently is 161. Patient states that she is walking at her place of employment but does not get to do any extra walking in the evening. Patient states that she has changed everything far as her diet. She is left the candies and sweets alone. Reports she is drinking a protein drink some days and eating salads. Patient shares that she got her shingles vaccine done and she seen the eye doctor. She was sent me this information so that it can be scanned into her chart. Patient also has had her colorectal screening and will try to see me this information as well. She reports that she does plan to get the COVID-19 vaccine through her employer. She reports that she was not able to get it this week because she was told by the pharmacist she would have to wait between that and finishing up her shingles vaccine. Prior to Admission medications    Medication Sig Start Date End Date Taking? Authorizing Provider   atorvastatin (LIPITOR) 10 mg tablet TAKE 1 TABLET EVERY DAY 1/21/21  Yes Binger, Molly MARGY PA-C   hydroCHLOROthiazide (HYDRODIURIL) 25 mg tablet TAKE ONE TABLET BY MOUTH ONCE DAILY 1/21/21  Yes Binger Molly MARGY, PA-C   lancets misc Use as directed Please test twice daily \"E11.9\" 1/21/21  Yes Binger Molly D, PA-C   losartan (COZAAR) 50 mg tablet TAKE 1 TABLET EVERY DAY 1/21/21  Yes Binger Molly D, PA-C   metFORMIN ER (GLUCOPHAGE XR) 500 mg tablet Take 4 Tabs by mouth daily (with dinner). 1/21/21  Yes Diana, Molly D, PA-C   sertraline (ZOLOFT) 25 mg tablet Take 1 Tab by mouth daily. 1/21/21  Yes Diana Molly D, PA-C   glucose blood VI test strips (Pharmacist Choice) strip Test blood sugar twice daily \"E11.9\" 1/19/21  Yes Diana Molly D PA-C   alcohol swabs padm Use as directed.   Please test twice daily \"E11.9\" 1/19/21  Yes Binger, Molly D, PA-C   glucose blood VI test strips (blood glucose test) strip (Brand True Metrix) Please test twice daily 10/28/20  Yes Molly Ortiz PA-C   selenium sulfide 2.5 % lotion Apply thin layer to the skin daily for 7 days  Indications: a fungal infection of the skin called tinea versicolor  Patient taking differently: daily as needed. Apply thin layer to the skin daily for 7 days  Indications: a fungal infection of the skin called tinea versicolor 9/29/20  Yes Molly Ortiz PA-C   azelastine (ASTELIN) 137 mcg (0.1 %) nasal spray 1 [place] by Both Nostrils route two (2) times a day. Use in each nostril as directed  Patient taking differently: 1 Spray by Both Nostrils route two (2) times daily as needed. Use in each nostril as directed 4/13/20  Yes Molly Ortiz PA-C   TRUE METRIX GLUCOSE METER misc  4/16/19  Yes Provider, Historical   Blood-Glucose Meter monitoring kit Test Once Daily in morning before breakfast \"E11.9\" 4/16/19  Yes Molly Ortiz PA-C   esomeprazole (NEXIUM) 40 mg capsule TAKE 1 CAPSULE BY MOUTH ONCE DAILY 5/29/18  Yes Molly Ortiz PA-C   calcium carbonate (TUMS) 200 mg calcium (500 mg) chew Take 1 Tab by mouth daily. Yes Provider, Historical   ketoconazole (NIZORAL) 2 % shampoo APPLY EXTERNALLY AS DIRECTED TO FEET LEGS ARMS 2 3 TIMES A WEEK 10/26/20   Provider, Historical   atorvastatin (LIPITOR) 10 mg tablet TAKE 1 TABLET EVERY DAY 1/19/21 1/21/21  Molly Ortiz PA-C   hydroCHLOROthiazide (HYDRODIURIL) 25 mg tablet TAKE ONE TABLET BY MOUTH ONCE DAILY 1/19/21 1/21/21  Molly Ortiz PA-C   lancets misc Use as directed Please test twice daily \"E11.9\" 1/19/21 1/21/21  Molly Ortiz PA-C   losartan (COZAAR) 50 mg tablet TAKE 1 TABLET EVERY DAY 1/19/21 1/21/21  Molly Ortiz PA-C   metFORMIN ER (GLUCOPHAGE XR) 500 mg tablet Take 4 Tabs by mouth daily (with dinner). 1/19/21 1/21/21  Molly Ortiz PA-C   sertraline (ZOLOFT) 25 mg tablet Take 1 Tab by mouth daily.  1/19/21 1/21/21  Melodie Amin PA-C     Patient Active Problem List    Diagnosis Date Noted    Acute emphysematous cholecystitis 04/10/2020    Acute gallstone pancreatitis 04/10/2020    Acute hyperglycemia 04/10/2020    Candidal vaginitis 04/10/2020    Contact dermatitis due to cosmetics 04/10/2020    Foot pain 04/10/2020    Plantar fascia syndrome 04/10/2020    Essential hypertension 06/21/2016    Mixed hyperlipidemia 06/21/2016    Diabetes mellitus type 2, controlled ([facility] 06/21/2016    GERD (gastroesophageal reflux disease) 02/25/2014     Current Outpatient Medications   Medication Sig Dispense Refill    atorvastatin (LIPITOR) 10 mg tablet TAKE 1 TABLET EVERY DAY 90 Tab 1    hydroCHLOROthiazide (HYDRODIURIL) 25 mg tablet TAKE ONE TABLET BY MOUTH ONCE DAILY 90 Tab 1    lancets misc Use as directed Please test twice daily \"E11.9\" 200 Each 11    losartan (COZAAR) 50 mg tablet TAKE 1 TABLET EVERY DAY 90 Tab 1    metFORMIN ER (GLUCOPHAGE XR) 500 mg tablet Take 4 Tabs by mouth daily (with dinner). 360 Tab 1    sertraline (ZOLOFT) 25 mg tablet Take 1 Tab by mouth daily. 90 Tab 1    glucose blood VI test strips (Pharmacist Choice) strip Test blood sugar twice daily \"E11.9\" 200 Strip 1    alcohol swabs padm Use as directed. Please test twice daily \"E11.9\" 200 Pad 3    glucose blood VI test strips (blood glucose test) strip (Brand True Metrix) Please test twice daily 100 Strip 3    selenium sulfide 2.5 % lotion Apply thin layer to the skin daily for 7 days  Indications: a fungal infection of the skin called tinea versicolor (Patient taking differently: daily as needed. Apply thin layer to the skin daily for 7 days  Indications: a fungal infection of the skin called tinea versicolor) 118 mL 0    azelastine (ASTELIN) 137 mcg (0.1 %) nasal spray 1 [place] by Both Nostrils route two (2) times a day. Use in each nostril as directed (Patient taking differently: 1 Spray by Both Nostrils route two (2) times daily as needed.  Use in each nostril as directed) 3 Bottle 1    TRUE METRIX GLUCOSE METER misc   0    Blood-Glucose Meter monitoring kit Test Once Daily in morning before breakfast \"E11.9\" 1 Kit 0    esomeprazole (NEXIUM) 40 mg capsule TAKE 1 CAPSULE BY MOUTH ONCE DAILY 30 Cap 1    calcium carbonate (TUMS) 200 mg calcium (500 mg) chew Take 1 Tab by mouth daily.  ketoconazole (NIZORAL) 2 % shampoo APPLY EXTERNALLY AS DIRECTED TO FEET LEGS ARMS 2 3 TIMES A WEEK       Allergies   Allergen Reactions    Paxil [Paroxetine Hcl] Other (comments)     Made patient feel jittery. ROS  See above  Objective:     Patient-Reported Vitals 1/20/2021   Patient-Reported Weight 170   Patient-Reported Height 53   Patient-Reported Pulse 89   Patient-Reported Systolic  425   Patient-Reported Diastolic 76      General: alert, cooperative, no distress   Mental  status: normal mood, behavior, speech, dress, motor activity, and thought processes, able to follow commands   HENT: NCAT   Neck: no visualized mass   Resp: no respiratory distress   Neuro: no gross deficits   Skin: no discoloration or lesions of concern on visible areas   Psychiatric: normal affect, consistent with stated mood, no evidence of hallucinations     Additional exam findings: We discussed the expected course, resolution and complications of the diagnosis(es) in detail. Medication risks, benefits, costs, interactions, and alternatives were discussed as indicated. I advised her to contact the office if her condition worsens, changes or fails to improve as anticipated. She expressed understanding with the diagnosis(es) and plan. Aylin Singh, who was evaluated through a patient-initiated, synchronous (real-time) audio-video encounter, and/or her healthcare decision maker, is aware that it is a billable service, with coverage as determined by her insurance carrier. She provided verbal consent to proceed: Yes, and patient identification was verified.  It was conducted pursuant to the emergency declaration under the [facility] [address] waiver authority and the [employer] and [employer] General Act. A caregiver was present when appropriate. Ability to conduct physical exam was limited. I was at home. The patient was at home.       Abbie Ortiz PA-C

## 2021-01-22 ENCOUNTER — TELEPHONE (OUTPATIENT)
Dept: INTERNAL MEDICINE CLINIC | Age: 71
End: 2021-01-22

## 2021-01-22 DIAGNOSIS — E11.9 TYPE 2 DIABETES MELLITUS WITHOUT COMPLICATION, WITHOUT LONG-TERM CURRENT USE OF INSULIN (HCC): ICD-10-CM

## 2021-01-22 DIAGNOSIS — E78.2 MIXED HYPERLIPIDEMIA: ICD-10-CM

## 2021-01-22 DIAGNOSIS — E11.9 TYPE 2 DIABETES MELLITUS WITHOUT COMPLICATION, WITHOUT LONG-TERM CURRENT USE OF INSULIN (HCC): Primary | ICD-10-CM

## 2021-01-22 DIAGNOSIS — E78.5 HYPERLIPIDEMIA, UNSPECIFIED HYPERLIPIDEMIA TYPE: ICD-10-CM

## 2021-01-27 LAB
ALB/GLOBRATIO, 58C: 1.3 (CALC) (ref 1–2.5)
ALBUMIN SERPL-MCNC: 4.3 G/DL (ref 3.6–5.1)
ALKALINE PHOSPHATASE, TOTAL, 25002000: 88 U/L (ref 37–153)
ALT SERPL-CCNC: 12 U/L (ref 6–29)
AST SERPL W P-5'-P-CCNC: 13 U/L (ref 10–35)
BASOPHILS # BLD: 23 CELLS/UL (ref 0–200)
BASOPHILS NFR BLD: 0.3 %
BILIRUB SERPL-MCNC: 0.3 MG/DL (ref 0.2–1.2)
BUN SERPL-MCNC: 15 MG/DL (ref 7–25)
BUN/CREATININE RATIO,BUCR: 27 (CALC) (ref 6–22)
CALCIUM SERPL-MCNC: 9.3 MG/DL (ref 8.6–10.4)
CHLORIDE SERPL-SCNC: 103 MMOL/L (ref 98–110)
CHOL/HDL RATIO,CHHDX: 3.3 (CALC)
CHOLEST SERPL-MCNC: 154 MG/DL
CO2 SERPL-SCNC: 27 MMOL/L (ref 20–32)
CREAT SERPL-MCNC: 0.55 MG/DL (ref 0.6–0.93)
EAG (MG/DL),9916804: 212 (CALC)
EAG (MMOL/L),9916805: 11.7 (CALC)
EOSINOPHIL # BLD: 8 CELLS/UL (ref 15–500)
EOSINOPHIL NFR BLD: 0.1 %
ERYTHROCYTE [DISTWIDTH] IN BLOOD BY AUTOMATED COUNT: 12.1 % (ref 11–15)
GLOBULIN,GLOB: 3.3 G/DL (CALC) (ref 1.9–3.7)
GLUCOSE SERPL-MCNC: 144 MG/DL (ref 65–139)
HBA1C MFR BLD HPLC: 9 % OF TOTAL HGB
HCT VFR BLD AUTO: 40.1 % (ref 35–45)
HDLC SERPL-MCNC: 46 MG/DL
HGB BLD-MCNC: 13.5 G/DL (ref 11.7–15.5)
LDL-CHOLESTEROL: 88 MG/DL (CALC)
LYMPHOCYTES # BLD: 1833 CELLS/UL (ref 850–3900)
LYMPHOCYTES NFR BLD: 23.5 %
MCH RBC QN AUTO: 31.5 PG (ref 27–33)
MCHC RBC AUTO-ENTMCNC: 33.7 G/DL (ref 32–36)
MCV RBC AUTO: 93.7 FL (ref 80–100)
MONOCYTES # BLD: 406 CELLS/UL (ref 200–950)
MONOCYTES NFR BLD: 5.2 %
NEUTROPHILS # BLD AUTO: 5530 CELLS/UL (ref 1500–7800)
NEUTROPHILS # BLD: 70.9 %
NON-HDL CHOLESTEROL, 011976: 108 MG/DL (CALC)
PLATELET # BLD AUTO: 272 THOUSAND/UL (ref 140–400)
PMV BLD AUTO: 10.5 FL (ref 7.5–12.5)
POTASSIUM SERPL-SCNC: 3.8 MMOL/L (ref 3.5–5.3)
PROT SERPL-MCNC: 7.6 G/DL (ref 6.1–8.1)
RBC # BLD AUTO: 4.28 MILLION/UL (ref 3.8–5.1)
SODIUM SERPL-SCNC: 138 MMOL/L (ref 135–146)
TRIGL SERPL-MCNC: 108 MG/DL (ref ?–150)
WBC # BLD AUTO: 7.8 THOUSAND/UL (ref 3.8–10.8)

## 2021-02-10 ENCOUNTER — IMMUNIZATION (OUTPATIENT)
Dept: INTERNAL MEDICINE CLINIC | Age: 71
End: 2021-02-10
Payer: MEDICARE

## 2021-02-10 DIAGNOSIS — Z23 ENCOUNTER FOR IMMUNIZATION: Primary | ICD-10-CM

## 2021-02-10 PROCEDURE — 91300 COVID-19, MRNA, LNP-S, PF, 30MCG/0.3ML DOSE(PFIZER): CPT | Performed by: FAMILY MEDICINE

## 2021-02-10 PROCEDURE — 0001A COVID-19, MRNA, LNP-S, PF, 30MCG/0.3ML DOSE(PFIZER): CPT | Performed by: FAMILY MEDICINE

## 2021-03-03 ENCOUNTER — IMMUNIZATION (OUTPATIENT)
Dept: INTERNAL MEDICINE CLINIC | Age: 71
End: 2021-03-03
Payer: MEDICARE

## 2021-03-03 DIAGNOSIS — Z23 ENCOUNTER FOR IMMUNIZATION: Primary | ICD-10-CM

## 2021-03-03 PROCEDURE — 0002A COVID-19, MRNA, LNP-S, PF, 30MCG/0.3ML DOSE(PFIZER): CPT | Performed by: FAMILY MEDICINE

## 2021-03-03 PROCEDURE — 91300 COVID-19, MRNA, LNP-S, PF, 30MCG/0.3ML DOSE(PFIZER): CPT | Performed by: FAMILY MEDICINE

## 2021-03-04 ENCOUNTER — TELEPHONE (OUTPATIENT)
Dept: INTERNAL MEDICINE CLINIC | Age: 71
End: 2021-03-04

## 2021-03-04 DIAGNOSIS — E11.9 TYPE 2 DIABETES MELLITUS WITHOUT COMPLICATION, WITHOUT LONG-TERM CURRENT USE OF INSULIN (HCC): Primary | ICD-10-CM

## 2021-03-04 RX ORDER — GLIPIZIDE 5 MG/1
5 TABLET ORAL 2 TIMES DAILY
Qty: 60 TAB | Refills: 1 | Status: SHIPPED | OUTPATIENT
Start: 2021-03-04 | End: 2021-03-25 | Stop reason: SDUPTHER

## 2021-03-25 ENCOUNTER — VIRTUAL VISIT (OUTPATIENT)
Dept: INTERNAL MEDICINE CLINIC | Age: 71
End: 2021-03-25
Payer: MEDICARE

## 2021-03-25 DIAGNOSIS — F43.21 GRIEVING: ICD-10-CM

## 2021-03-25 DIAGNOSIS — E11.9 TYPE 2 DIABETES MELLITUS WITHOUT COMPLICATION, WITHOUT LONG-TERM CURRENT USE OF INSULIN (HCC): Primary | ICD-10-CM

## 2021-03-25 DIAGNOSIS — I10 ESSENTIAL HYPERTENSION: ICD-10-CM

## 2021-03-25 DIAGNOSIS — E78.5 HYPERLIPIDEMIA, UNSPECIFIED HYPERLIPIDEMIA TYPE: ICD-10-CM

## 2021-03-25 DIAGNOSIS — F32.A DEPRESSION, UNSPECIFIED DEPRESSION TYPE: ICD-10-CM

## 2021-03-25 PROCEDURE — G8427 DOCREV CUR MEDS BY ELIG CLIN: HCPCS | Performed by: PHYSICIAN ASSISTANT

## 2021-03-25 PROCEDURE — G8756 NO BP MEASURE DOC: HCPCS | Performed by: PHYSICIAN ASSISTANT

## 2021-03-25 PROCEDURE — G8536 NO DOC ELDER MAL SCRN: HCPCS | Performed by: PHYSICIAN ASSISTANT

## 2021-03-25 PROCEDURE — 1101F PT FALLS ASSESS-DOCD LE1/YR: CPT | Performed by: PHYSICIAN ASSISTANT

## 2021-03-25 PROCEDURE — G8417 CALC BMI ABV UP PARAM F/U: HCPCS | Performed by: PHYSICIAN ASSISTANT

## 2021-03-25 PROCEDURE — G8432 DEP SCR NOT DOC, RNG: HCPCS | Performed by: PHYSICIAN ASSISTANT

## 2021-03-25 PROCEDURE — 1090F PRES/ABSN URINE INCON ASSESS: CPT | Performed by: PHYSICIAN ASSISTANT

## 2021-03-25 PROCEDURE — G8399 PT W/DXA RESULTS DOCUMENT: HCPCS | Performed by: PHYSICIAN ASSISTANT

## 2021-03-25 PROCEDURE — G9899 SCRN MAM PERF RSLTS DOC: HCPCS | Performed by: PHYSICIAN ASSISTANT

## 2021-03-25 PROCEDURE — 99214 OFFICE O/P EST MOD 30 MIN: CPT | Performed by: PHYSICIAN ASSISTANT

## 2021-03-25 PROCEDURE — 3017F COLORECTAL CA SCREEN DOC REV: CPT | Performed by: PHYSICIAN ASSISTANT

## 2021-03-25 PROCEDURE — 2022F DILAT RTA XM EVC RTNOPTHY: CPT | Performed by: PHYSICIAN ASSISTANT

## 2021-03-25 RX ORDER — BLOOD-GLUCOSE METER
EACH MISCELLANEOUS
Qty: 1 EACH | Refills: 0 | Status: SHIPPED | OUTPATIENT
Start: 2021-03-25

## 2021-03-25 RX ORDER — LOSARTAN POTASSIUM 50 MG/1
TABLET ORAL
Qty: 90 TAB | Refills: 1 | Status: SHIPPED | OUTPATIENT
Start: 2021-03-25 | End: 2021-06-28 | Stop reason: SDUPTHER

## 2021-03-25 RX ORDER — GLIPIZIDE 5 MG/1
5 TABLET ORAL 2 TIMES DAILY
Qty: 180 TAB | Refills: 1 | Status: SHIPPED | OUTPATIENT
Start: 2021-03-25 | End: 2021-06-28 | Stop reason: SDUPTHER

## 2021-03-25 RX ORDER — ATORVASTATIN CALCIUM 10 MG/1
TABLET, FILM COATED ORAL
Qty: 90 TAB | Refills: 1 | Status: SHIPPED | OUTPATIENT
Start: 2021-03-25 | End: 2021-06-28 | Stop reason: SDUPTHER

## 2021-03-25 RX ORDER — ESOMEPRAZOLE MAGNESIUM 40 MG/1
CAPSULE, DELAYED RELEASE ORAL
Qty: 90 CAP | Refills: 1 | Status: SHIPPED | OUTPATIENT
Start: 2021-03-25

## 2021-03-25 RX ORDER — SERTRALINE HYDROCHLORIDE 25 MG/1
25 TABLET, FILM COATED ORAL DAILY
Qty: 90 TAB | Refills: 1 | Status: SHIPPED | OUTPATIENT
Start: 2021-03-25 | End: 2021-06-28 | Stop reason: SDUPTHER

## 2021-03-25 RX ORDER — HYDROCHLOROTHIAZIDE 25 MG/1
TABLET ORAL
Qty: 90 TAB | Refills: 1 | Status: SHIPPED | OUTPATIENT
Start: 2021-03-25 | End: 2021-06-28 | Stop reason: SDUPTHER

## 2021-03-25 NOTE — PROGRESS NOTES
Aylin Singh is a 69 y.o. female who was seen by synchronous (real-time) audio-video technology on 3/25/2021 for Medication Evaluation and Medication Refill        Assessment & Plan:   Diagnoses and all orders for this visit:    1. Type 2 diabetes mellitus without complication, without long-term current use of insulin (HCC)  -     True Metrix Glucose Meter misc; Test 2-3 times daily  -     glipiZIDE (GLUCOTROL) 5 mg tablet; Take 1 Tab by mouth two (2) times a day. 2. Grieving    3. Hyperlipidemia, unspecified hyperlipidemia type  -     atorvastatin (LIPITOR) 10 mg tablet; TAKE 1 TABLET EVERY DAY    4. Essential hypertension  -     hydroCHLOROthiazide (HYDRODIURIL) 25 mg tablet; TAKE ONE TABLET BY MOUTH ONCE DAILY  -     losartan (COZAAR) 50 mg tablet; TAKE 1 TABLET EVERY DAY    5. Depression, unspecified depression type  -     sertraline (ZOLOFT) 25 mg tablet; Take 1 Tab by mouth daily. Other orders  -     esomeprazole (NEXIUM) 40 mg capsule; TAKE 1 CAPSULE BY MOUTH ONCE DAILY    The patient and I spoke at length about her recent loss. I explained my thinking is understandable that she has not been sticking to her diet right now. I explained to her that it would take some time to go through the grieving process. I will send over a prescription for new glucometer and fill her medications. The patient has only been on the glipizide for a few weeks. We should recheck her numbers in a couple more months. I advised the patient to please reach out to me if she feels she needs anything before her next appointment time. I spent at least 30 minutes on this visit with this established patient. 712  Subjective:   Patient presents for follow-up. She states that a lot has been going on in the past several months. She states back in January she lost her brother in a fire. Then on February 23 she lost her niece in an accident. She states that her niece was killed during a car accident due to black ice.   The patient reports that this has been a very difficult time for her and she admits that she has not been doing everything she can far as her diet. She states that her numbers have been running from 120s to 170s. The patient states however that she does not believe her glucometer is working properly and would like a new one. She states her blood pressure has been doing well. Patient does not feel she needs to increase her sertraline. Prior to Admission medications    Medication Sig Start Date End Date Taking? Authorizing Provider   True Metrix Glucose Meter misc Test 2-3 times daily 3/25/21  Yes Molly Ortiz PA-C   atorvastatin (LIPITOR) 10 mg tablet TAKE 1 TABLET EVERY DAY 3/25/21  Yes Molly Ortiz PA-C   esomeprazole (NEXIUM) 40 mg capsule TAKE 1 CAPSULE BY MOUTH ONCE DAILY 3/25/21  Yes Molly Ortiz PA-C   glipiZIDE (GLUCOTROL) 5 mg tablet Take 1 Tab by mouth two (2) times a day. 3/25/21  Yes Molly Ortiz PA-C   hydroCHLOROthiazide (HYDRODIURIL) 25 mg tablet TAKE ONE TABLET BY MOUTH ONCE DAILY 3/25/21  Yes Molly Ortiz PA-C   sertraline (ZOLOFT) 25 mg tablet Take 1 Tab by mouth daily. 3/25/21  Yes Molly Ortiz PA-C   losartan (COZAAR) 50 mg tablet TAKE 1 TABLET EVERY DAY 3/25/21  Yes Molly Ortiz PA-C   alcohol swabs padm Use as directed. Please test twice daily \"E11.9\" 1/19/21  Yes Molly Ortiz PA-C   glipiZIDE (GLUCOTROL) 5 mg tablet Take 1 Tab by mouth two (2) times a day.  3/4/21 3/25/21  Amor Ortiz PA-NISREEN   ketoconazole (NIZORAL) 2 % shampoo APPLY EXTERNALLY AS DIRECTED TO FEET LEGS ARMS 2 3 TIMES A WEEK 10/26/20   Provider, Historical   lancets misc Use as directed Please test twice daily \"E11.9\" 1/21/21   [place] cortescie D, PA-C   atorvastatin (LIPITOR) 10 mg tablet TAKE 1 TABLET EVERY DAY 1/21/21 3/25/21  Molly Ortiz PA-C   hydroCHLOROthiazide (HYDRODIURIL) 25 mg tablet TAKE ONE TABLET BY MOUTH ONCE DAILY 1/21/21 3/25/21  Stefania Ortiz, SIENNA   losartan (COZAAR) 50 mg tablet TAKE 1 TABLET EVERY DAY 1/21/21 3/25/21  Molly Ortiz PA-C   sertraline (ZOLOFT) 25 mg tablet Take 1 Tab by mouth daily. 1/21/21 3/25/21  Molly Ortiz PA-C   glucose blood VI test strips (Pharmacist Choice) strip Test blood sugar twice daily \"E11.9\" 1/19/21   Molly Ortiz PA-C   glucose blood VI test strips (blood glucose test) strip (Brand True Metrix) Please test twice daily 10/28/20   Molly Ortiz PA-C   selenium sulfide 2.5 % lotion Apply thin layer to the skin daily for 7 days  Indications: a fungal infection of the skin called tinea versicolor  Patient taking differently: daily as needed. Apply thin layer to the skin daily for 7 days  Indications: a fungal infection of the skin called tinea versicolor 9/29/20   Molly Ortiz PA-C   azelastine (ASTELIN) 137 mcg (0.1 %) nasal spray 1 [place] by Both Nostrils route two (2) times a day. Use in each nostril as directed  Patient taking differently: 1 Spray by Both Nostrils route two (2) times daily as needed. Use in each nostril as directed 4/13/20   Molly Ortiz PA-C   TRUE METRIX GLUCOSE METER misc  4/16/19 3/25/21  Provider, Historical   Blood-Glucose Meter monitoring kit Test Once Daily in morning before breakfast \"E11.9\" 4/16/19   Molly Ortiz PA-C   esomeprazole (NEXIUM) 40 mg capsule TAKE 1 CAPSULE BY MOUTH ONCE DAILY 5/29/18 3/25/21  Molly Ortiz PA-C   calcium carbonate (TUMS) 200 mg calcium (500 mg) chew Take 1 Tab by mouth daily.     Provider, Historical     Patient Active Problem List    Diagnosis Date Noted    Acute emphysematous cholecystitis 04/10/2020    Acute gallstone pancreatitis 04/10/2020    Acute hyperglycemia 04/10/2020    Candidal vaginitis 04/10/2020    Contact dermatitis due to cosmetics 04/10/2020    Foot pain 04/10/2020    Plantar fascia syndrome 04/10/2020    Essential hypertension 06/21/2016    Mixed hyperlipidemia 06/21/2016    Diabetes mellitus type 2, controlled ([facility] 06/21/2016    GERD (gastroesophageal reflux disease) 02/25/2014     Current Outpatient Medications   Medication Sig Dispense Refill    True Metrix Glucose Meter misc Test 2-3 times daily 1 Each 0    atorvastatin (LIPITOR) 10 mg tablet TAKE 1 TABLET EVERY DAY 90 Tab 1    esomeprazole (NEXIUM) 40 mg capsule TAKE 1 CAPSULE BY MOUTH ONCE DAILY 90 Cap 1    glipiZIDE (GLUCOTROL) 5 mg tablet Take 1 Tab by mouth two (2) times a day. 180 Tab 1    hydroCHLOROthiazide (HYDRODIURIL) 25 mg tablet TAKE ONE TABLET BY MOUTH ONCE DAILY 90 Tab 1    sertraline (ZOLOFT) 25 mg tablet Take 1 Tab by mouth daily. 90 Tab 1    losartan (COZAAR) 50 mg tablet TAKE 1 TABLET EVERY DAY 90 Tab 1    alcohol swabs padm Use as directed. Please test twice daily \"E11.9\" 200 Pad 3    ketoconazole (NIZORAL) 2 % shampoo APPLY EXTERNALLY AS DIRECTED TO FEET LEGS ARMS 2 3 TIMES A WEEK      lancets misc Use as directed Please test twice daily \"E11.9\" 200 Each 11    glucose blood VI test strips (Pharmacist Choice) strip Test blood sugar twice daily \"E11.9\" 200 Strip 1    glucose blood VI test strips (blood glucose test) strip (Brand True Metrix) Please test twice daily 100 Strip 3    selenium sulfide 2.5 % lotion Apply thin layer to the skin daily for 7 days  Indications: a fungal infection of the skin called tinea versicolor (Patient taking differently: daily as needed. Apply thin layer to the skin daily for 7 days  Indications: a fungal infection of the skin called tinea versicolor) 118 mL 0    azelastine (ASTELIN) 137 mcg (0.1 %) nasal spray 1 [place] by Both Nostrils route two (2) times a day. Use in each nostril as directed (Patient taking differently: 1 Spray by Both Nostrils route two (2) times daily as needed.  Use in each nostril as directed) 3 Bottle 1    Blood-Glucose Meter monitoring kit Test Once Daily in morning before breakfast \"E11.9\" 1 Kit 0    calcium carbonate (TUMS) 200 mg calcium (500 mg) chew Take 1 Tab by mouth daily. Allergies   Allergen Reactions    Paxil [Paroxetine Hcl] Other (comments)     Made patient feel jittery. ROS  See above  Objective:     Patient-Reported Vitals 3/25/2021   Patient-Reported Weight 165   Patient-Reported Height 53   Patient-Reported Pulse -   Patient-Reported Systolic  -   Patient-Reported Diastolic -   Patient-Reported Peak Flow No   Patient-Reported LMP No      General: alert, cooperative, no distress   Mental  status: normal mood, behavior, speech, dress, motor activity, and thought processes, able to follow commands   HENT: NCAT   Neck: no visualized mass   Resp: no respiratory distress   Neuro: no gross deficits   Skin: no discoloration or lesions of concern on visible areas   Psychiatric: normal affect, consistent with stated mood, no evidence of hallucinations     Additional exam findings: We discussed the expected course, resolution and complications of the diagnosis(es) in detail. Medication risks, benefits, costs, interactions, and alternatives were discussed as indicated. I advised her to contact the office if her condition worsens, changes or fails to improve as anticipated. She expressed understanding with the diagnosis(es) and plan. Annette Jeffrey was evaluated through a synchronous (real-time) audio-video encounter. The patient (or guardian if applicable) is aware that this is a billable service. Verbal consent to proceed has been obtained within the past 12 months. The visit was conducted pursuant to the emergency declaration under the [address] [place] [address], [address] [place] [address] authority and the [employer] General Act. Patient identification was verified, and a caregiver was present when appropriate. The patient was located in a state where the provider was credentialed to provide care.     Tye Ortiz PA-C

## 2021-03-30 ENCOUNTER — TELEPHONE (OUTPATIENT)
Dept: INTERNAL MEDICINE CLINIC | Age: 71
End: 2021-03-30

## 2021-04-22 DIAGNOSIS — I10 ESSENTIAL HYPERTENSION: ICD-10-CM

## 2021-04-22 DIAGNOSIS — E11.9 TYPE 2 DIABETES MELLITUS WITHOUT COMPLICATION, WITHOUT LONG-TERM CURRENT USE OF INSULIN (HCC): ICD-10-CM

## 2021-04-22 DIAGNOSIS — E78.5 HYPERLIPIDEMIA, UNSPECIFIED HYPERLIPIDEMIA TYPE: ICD-10-CM

## 2021-06-28 ENCOUNTER — PATIENT MESSAGE (OUTPATIENT)
Dept: INTERNAL MEDICINE CLINIC | Age: 71
End: 2021-06-28

## 2021-06-28 DIAGNOSIS — E78.5 HYPERLIPIDEMIA, UNSPECIFIED HYPERLIPIDEMIA TYPE: ICD-10-CM

## 2021-06-28 DIAGNOSIS — I10 ESSENTIAL HYPERTENSION: ICD-10-CM

## 2021-06-28 DIAGNOSIS — E11.9 TYPE 2 DIABETES MELLITUS WITHOUT COMPLICATION, WITHOUT LONG-TERM CURRENT USE OF INSULIN (HCC): ICD-10-CM

## 2021-06-28 DIAGNOSIS — E11.9 TYPE 2 DIABETES MELLITUS WITHOUT COMPLICATION (HCC): ICD-10-CM

## 2021-06-28 DIAGNOSIS — F32.A DEPRESSION, UNSPECIFIED DEPRESSION TYPE: ICD-10-CM

## 2021-06-28 RX ORDER — GLIPIZIDE 5 MG/1
5 TABLET ORAL 2 TIMES DAILY
Qty: 180 TABLET | Refills: 1 | Status: CANCELLED | OUTPATIENT
Start: 2021-06-28

## 2021-06-28 RX ORDER — GLIPIZIDE 5 MG/1
5 TABLET ORAL 2 TIMES DAILY
Qty: 40 TABLET | Refills: 0 | Status: SHIPPED | OUTPATIENT
Start: 2021-06-28 | End: 2021-09-28 | Stop reason: SDUPTHER

## 2021-06-29 RX ORDER — ISOPROPYL ALCOHOL 70 ML/100ML
SWAB TOPICAL
Qty: 200 PAD | Refills: 3 | Status: SHIPPED | OUTPATIENT
Start: 2021-06-29 | End: 2022-01-24 | Stop reason: SDUPTHER

## 2021-06-29 RX ORDER — SERTRALINE HYDROCHLORIDE 25 MG/1
25 TABLET, FILM COATED ORAL DAILY
Qty: 90 TABLET | Refills: 1 | Status: SHIPPED | OUTPATIENT
Start: 2021-06-29 | End: 2021-07-12 | Stop reason: DRUGHIGH

## 2021-06-29 RX ORDER — LANCING DEVICE
EACH MISCELLANEOUS
Qty: 200 STRIP | Refills: 1 | Status: SHIPPED | OUTPATIENT
Start: 2021-06-29 | End: 2022-07-18 | Stop reason: SDUPTHER

## 2021-06-29 RX ORDER — ATORVASTATIN CALCIUM 10 MG/1
TABLET, FILM COATED ORAL
Qty: 90 TABLET | Refills: 1 | Status: SHIPPED | OUTPATIENT
Start: 2021-06-29 | End: 2021-10-18 | Stop reason: SDUPTHER

## 2021-06-29 RX ORDER — HYDROCHLOROTHIAZIDE 25 MG/1
TABLET ORAL
Qty: 90 TABLET | Refills: 1 | Status: SHIPPED | OUTPATIENT
Start: 2021-06-29 | End: 2021-10-18 | Stop reason: SDUPTHER

## 2021-06-29 RX ORDER — IBUPROFEN 200 MG
CAPSULE ORAL
Qty: 100 STRIP | Refills: 3 | Status: SHIPPED | OUTPATIENT
Start: 2021-06-29

## 2021-06-29 RX ORDER — LOSARTAN POTASSIUM 50 MG/1
TABLET ORAL
Qty: 90 TABLET | Refills: 1 | Status: SHIPPED | OUTPATIENT
Start: 2021-06-29 | End: 2021-10-18 | Stop reason: SDUPTHER

## 2021-07-12 ENCOUNTER — OFFICE VISIT (OUTPATIENT)
Dept: INTERNAL MEDICINE CLINIC | Age: 71
End: 2021-07-12
Payer: MEDICARE

## 2021-07-12 VITALS
HEIGHT: 63 IN | OXYGEN SATURATION: 94 % | WEIGHT: 179 LBS | BODY MASS INDEX: 31.71 KG/M2 | RESPIRATION RATE: 18 BRPM | TEMPERATURE: 98.3 F | DIASTOLIC BLOOD PRESSURE: 77 MMHG | HEART RATE: 65 BPM | SYSTOLIC BLOOD PRESSURE: 120 MMHG

## 2021-07-12 DIAGNOSIS — Z12.11 ENCOUNTER FOR SCREENING COLONOSCOPY: ICD-10-CM

## 2021-07-12 DIAGNOSIS — I10 ESSENTIAL HYPERTENSION: ICD-10-CM

## 2021-07-12 DIAGNOSIS — E11.9 TYPE 2 DIABETES MELLITUS WITHOUT COMPLICATION, WITHOUT LONG-TERM CURRENT USE OF INSULIN (HCC): ICD-10-CM

## 2021-07-12 DIAGNOSIS — F32.A DEPRESSION, UNSPECIFIED DEPRESSION TYPE: ICD-10-CM

## 2021-07-12 DIAGNOSIS — Z00.00 MEDICARE ANNUAL WELLNESS VISIT, SUBSEQUENT: Primary | ICD-10-CM

## 2021-07-12 DIAGNOSIS — E78.5 HYPERLIPIDEMIA, UNSPECIFIED HYPERLIPIDEMIA TYPE: ICD-10-CM

## 2021-07-12 DIAGNOSIS — F43.21 GRIEVING: ICD-10-CM

## 2021-07-12 PROCEDURE — G0439 PPPS, SUBSEQ VISIT: HCPCS | Performed by: PHYSICIAN ASSISTANT

## 2021-07-12 PROCEDURE — G8399 PT W/DXA RESULTS DOCUMENT: HCPCS | Performed by: PHYSICIAN ASSISTANT

## 2021-07-12 PROCEDURE — G8752 SYS BP LESS 140: HCPCS | Performed by: PHYSICIAN ASSISTANT

## 2021-07-12 PROCEDURE — 99214 OFFICE O/P EST MOD 30 MIN: CPT | Performed by: PHYSICIAN ASSISTANT

## 2021-07-12 PROCEDURE — 3017F COLORECTAL CA SCREEN DOC REV: CPT | Performed by: PHYSICIAN ASSISTANT

## 2021-07-12 PROCEDURE — 1101F PT FALLS ASSESS-DOCD LE1/YR: CPT | Performed by: PHYSICIAN ASSISTANT

## 2021-07-12 PROCEDURE — G8417 CALC BMI ABV UP PARAM F/U: HCPCS | Performed by: PHYSICIAN ASSISTANT

## 2021-07-12 PROCEDURE — G9899 SCRN MAM PERF RSLTS DOC: HCPCS | Performed by: PHYSICIAN ASSISTANT

## 2021-07-12 PROCEDURE — G8754 DIAS BP LESS 90: HCPCS | Performed by: PHYSICIAN ASSISTANT

## 2021-07-12 PROCEDURE — 1090F PRES/ABSN URINE INCON ASSESS: CPT | Performed by: PHYSICIAN ASSISTANT

## 2021-07-12 PROCEDURE — G8432 DEP SCR NOT DOC, RNG: HCPCS | Performed by: PHYSICIAN ASSISTANT

## 2021-07-12 PROCEDURE — 2022F DILAT RTA XM EVC RTNOPTHY: CPT | Performed by: PHYSICIAN ASSISTANT

## 2021-07-12 PROCEDURE — 3046F HEMOGLOBIN A1C LEVEL >9.0%: CPT | Performed by: PHYSICIAN ASSISTANT

## 2021-07-12 PROCEDURE — G8536 NO DOC ELDER MAL SCRN: HCPCS | Performed by: PHYSICIAN ASSISTANT

## 2021-07-12 PROCEDURE — G8427 DOCREV CUR MEDS BY ELIG CLIN: HCPCS | Performed by: PHYSICIAN ASSISTANT

## 2021-07-12 RX ORDER — SERTRALINE HYDROCHLORIDE 50 MG/1
50 TABLET, FILM COATED ORAL DAILY
Qty: 90 TABLET | Refills: 1 | Status: SHIPPED | OUTPATIENT
Start: 2021-07-12 | End: 2021-10-18 | Stop reason: SDUPTHER

## 2021-07-12 NOTE — PROGRESS NOTES
1. Have you been to the ER, urgent care clinic since your last visit? Hospitalized since your last visit? No    2. Have you seen or consulted any other health care providers outside of the [address] [place] [address] since your last visit? Include any pap smears or colon screening.  No   Chief Complaint   Patient presents with    Medication Refill

## 2021-07-12 NOTE — PATIENT INSTRUCTIONS
Medicare Wellness Visit, Female     The best way to live healthy is to have a lifestyle where you eat a well-balanced diet, exercise regularly, limit alcohol use, and quit all forms of tobacco/nicotine, if applicable. Regular preventive services are another way to keep healthy. Preventive services (vaccines, screening tests, monitoring & exams) can help personalize your care plan, which helps you manage your own care. Screening tests can find health problems at the earliest stages, when they are easiest to treat. [facility] follows the current, evidence-based guidelines published by the [facility] ([facility]) when recommending preventive services for our patients. Because we follow these guidelines, sometimes recommendations change over time as research supports it. (For example, mammograms used to be recommended annually. Even though Medicare will still pay for an annual mammogram, the newer guidelines recommend a mammogram every two years for women of average risk). Of course, you and your doctor may decide to screen more often for some diseases, based on your risk and your co-morbidities (chronic disease you are already diagnosed with). Preventive services for you include:  - Medicare offers their members a free annual wellness visit, which is time for you and your primary care provider to discuss and plan for your preventive service needs. Take advantage of this benefit every year!  -All adults over the age of 72 should receive the recommended pneumonia vaccines. Current USPSTF guidelines recommend a series of two vaccines for the best pneumonia protection.   -All adults should have a flu vaccine yearly and a tetanus vaccine every 10 years.   -All adults age 48 and older should receive the shingles vaccines (series of two vaccines).       -All adults age 38-68 who are overweight should have a diabetes screening test once every three years.   -All adults born between [birth year] and 1965 should be screened once for Hepatitis C.  -Other screening tests and preventive services for persons with diabetes include: an eye exam to screen for diabetic retinopathy, a kidney function test, a foot exam, and stricter control over your cholesterol.   -Cardiovascular screening for adults with routine risk involves an electrocardiogram (ECG) at intervals determined by your doctor.   -Colorectal cancer screenings should be done for adults age 54-65 with no increased risk factors for colorectal cancer. There are a number of acceptable methods of screening for this type of cancer. Each test has its own benefits and drawbacks. Discuss with your doctor what is most appropriate for you during your annual wellness visit. The different tests include: colonoscopy (considered the best screening method), a fecal occult blood test, a fecal DNA test, and sigmoidoscopy.    -A bone mass density test is recommended when a woman turns 65 to screen for osteoporosis. This test is only recommended one time, as a screening. Some providers will use this same test as a disease monitoring tool if you already have osteoporosis. -Breast cancer screenings are recommended every other year for women of normal risk, age 54-69.  -Cervical cancer screenings for women over age 72 are only recommended with certain risk factors.      Here is a list of your current Health Maintenance items (your personalized list of preventive services) with a due date:  Health Maintenance Due   Topic Date Due    Eye Exam  Never done    Shingles Vaccine (1 of 2) Never done    Colorectal Screening  06/17/2019    Annual Well Visit  04/14/2021    Hemoglobin A1C    04/26/2021    Albumin Urine Test  07/23/2021

## 2021-07-12 NOTE — PROGRESS NOTES
This is the Subsequent Medicare Annual Wellness Exam, performed 12 months or more after the Initial AWV or the last Subsequent AWV    I have reviewed the patient's medical history in detail and updated the computerized patient record. Assessment/Plan   Education and counseling provided:  Are appropriate based on today's review and evaluation  Screening Mammography  Colorectal cancer screening tests    1. Medicare annual wellness visit, subsequent  2. Type 2 diabetes mellitus without complication, without long-term current use of insulin (HCC)  -     HEMOGLOBIN A1C WITH EAG; Future  -     METABOLIC PANEL, COMPREHENSIVE; Future  -     CBC WITH AUTOMATED DIFF; Future  3. Essential hypertension  -     METABOLIC PANEL, COMPREHENSIVE; Future  -     CBC WITH AUTOMATED DIFF; Future  4. Hyperlipidemia, unspecified hyperlipidemia type  -     METABOLIC PANEL, COMPREHENSIVE; Future  -     CBC WITH AUTOMATED DIFF; Future  -     LIPID PANEL; Future  5. Depression, unspecified depression type  -     sertraline (ZOLOFT) 50 mg tablet; Take 1 Tablet by mouth daily. , Normal, Disp-90 Tablet, R-1  6. Grieving  -     sertraline (ZOLOFT) 50 mg tablet; Take 1 Tablet by mouth daily. , Normal, Disp-90 Tablet, R-1  7. Encounter for screening colonoscopy  -     REFERRAL TO GASTROENTEROLOGY       Depression Risk Factor Screening     3 most recent PHQ Screens 7/12/2021   Little interest or pleasure in doing things Not at all   Feeling down, depressed, irritable, or hopeless Nearly every day   Total Score PHQ 2 3       Alcohol Risk Screen    Do you average more than 1 drink per night or more than 7 drinks a week:  No    On any one occasion in the past three months have you have had more than 3 drinks containing alcohol:  No        Functional Ability and Level of Safety    Hearing: patient reports she feels like her hearing is normal. 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  the patient reports she would interested in having her hearing checked. Activities of Daily Living: The home contains: no safety equipment. Patient does total self care      Ambulation: with no difficulty     Fall Risk:  Fall Risk Assessment, last 12 mths 7/12/2021   Able to walk? Yes   Fall in past 12 months? 0   Do you feel unsteady? 0   Are you worried about falling 0      Abuse Screen:  Patient is not abused       Cognitive Screening    Has your family/caregiver stated any concerns about your memory: no         Health Maintenance Due     Health Maintenance Due   Topic Date Due    Eye Exam Retinal or Dilated  Never done    Shingrix Vaccine Age 50> (1 of 2) Never done    Colorectal Cancer Screening Combo  06/17/2019    Medicare Yearly Exam  04/14/2021    MICROALBUMIN Q1  07/23/2021       Patient Care Team   Patient Care Team:  Ruth Hutchins PA-C [facility] PCP [facility] Empaneled Provider    History     Patient Active Problem List   Diagnosis Code    GERD (gastroesophageal reflux disease) K21.9    Essential hypertension I10    Mixed hyperlipidemia E78.2    Diabetes mellitus type 2, controlled ([address] E11.9    Acute emphysematous cholecystitis K81.0    Acute gallstone pancreatitis K85.10    Acute hyperglycemia R73.9    Candidal vaginitis B37.3    Contact dermatitis due to cosmetics L25.0    Foot pain M79.673    Plantar fascia syndrome M72.2     Past Medical History:   Diagnosis Date    Diabetes ([address]     Hypercholesterolemia     Hypertension       Past Surgical History:   Procedure Laterality Date    HX GYN      ectopic pregnancy x 2    HX GYN      ovarian cyst    HX TOTAL ABDOMINAL HYSTERECTOMY  1/1/1980    early 80's     Current Outpatient Medications   Medication Sig Dispense Refill    sertraline (ZOLOFT) 50 mg tablet Take 1 Tablet by mouth daily. 90 Tablet 1    alcohol swabs padm Use as directed.   Please test twice daily \"E11.9\" 200 Pad 3    atorvastatin (LIPITOR) 10 mg tablet TAKE 1 TABLET EVERY DAY 90 Tablet 1    glucose blood VI test strips (blood glucose test) strip (Brand True Metrix) Please test twice daily 100 Strip 3    glucose blood VI test strips (Pharmacist Choice) strip Test blood sugar twice daily \"E11.9\" 200 Strip 1    hydroCHLOROthiazide (HYDRODIURIL) 25 mg tablet TAKE ONE TABLET BY MOUTH ONCE DAILY 90 Tablet 1    losartan (COZAAR) 50 mg tablet TAKE 1 TABLET EVERY DAY 90 Tablet 1    glipiZIDE (GLUCOTROL) 5 mg tablet Take 1 Tablet by mouth two (2) times a day. 40 Tablet 0    True Metrix Glucose Meter misc Test 2-3 times daily 1 Each 0    esomeprazole (NEXIUM) 40 mg capsule TAKE 1 CAPSULE BY MOUTH ONCE DAILY 90 Cap 1    ketoconazole (NIZORAL) 2 % shampoo APPLY EXTERNALLY AS DIRECTED TO FEET LEGS ARMS 2 3 TIMES A WEEK      lancets misc Use as directed Please test twice daily \"E11.9\" 200 Each 11    selenium sulfide 2.5 % lotion Apply thin layer to the skin daily for 7 days  Indications: a fungal infection of the skin called tinea versicolor (Patient taking differently: daily as needed. Apply thin layer to the skin daily for 7 days  Indications: a fungal infection of the skin called tinea versicolor) 118 mL 0    azelastine (ASTELIN) 137 mcg (0.1 %) nasal spray 1 [place] by Both Nostrils route two (2) times a day. Use in each nostril as directed (Patient taking differently: 1 Spray by Both Nostrils route two (2) times daily as needed. Use in each nostril as directed) 3 Bottle 1    Blood-Glucose Meter monitoring kit Test Once Daily in morning before breakfast \"E11.9\" 1 Kit 0    calcium carbonate (TUMS) 200 mg calcium (500 mg) chew Take 1 Tab by mouth daily. Allergies   Allergen Reactions    Paxil [Paroxetine Hcl] Other (comments)     Made patient feel jittery.         Family History   Problem Relation Age of Onset    Diabetes Mother     Hypertension Mother     Cancer Brother     Cancer Other     Breast Cancer Sister         over 48    Heart Disease Sister      Social History     Tobacco Use    Smoking status: Former Smoker     Packs/day: 0.25     Years: 10.00     Pack years: 2.50     Quit date: 2014     Years since quittin.0    Smokeless tobacco: Never Used   Substance Use Topics    Alcohol use: Yes     Alcohol/week: 0.8 standard drinks     Types: 1 Cans of beer per week     Comment: once weekly         Siva Ortiz PA-C   Subjective:     Jones Hackett is a 69 y.o. female who presents for follow up of diabetes, hypertension and hyperlipidemia. Diet and Lifestyle: patient has not been watching her diet and she is not exercising. Home BP Monitoring: is not well controlled at home  She states her glucose levels have been between 133 to 180. Cardiovascular ROS: taking medications as instructed, no medication side effects noted, no TIA's, no chest pain on exertion, no dyspnea on exertion, no swelling of ankles. New concerns: The patient reports that she has been under a lot of stress. She is still grieving the loss of her brother that passed at the beginning of the year and a niece that was lost during a car accident. They passed away about a month apart. She reports that she knows she is still going through the grieving process but often will suppress those feelings. She also shares that she had to replace her car she was not happy about having to take on a car payment. She has not been exercising and she admits that she has not been eating the foods that she should. The patient reports that she likes to snack on ice cream and she is also been eating pork skins.      Patient Active Problem List    Diagnosis Date Noted    Acute emphysematous cholecystitis 04/10/2020    Acute gallstone pancreatitis 04/10/2020    Acute hyperglycemia 04/10/2020    Candidal vaginitis 04/10/2020    Contact dermatitis due to cosmetics 04/10/2020    Foot pain 04/10/2020    Plantar fascia syndrome 04/10/2020    Essential hypertension 2016  Mixed hyperlipidemia 06/21/2016    Diabetes mellitus type 2, controlled ([facility] [address] 06/21/2016    GERD (gastroesophageal reflux disease) 02/25/2014     Current Outpatient Medications   Medication Sig Dispense Refill    sertraline (ZOLOFT) 50 mg tablet Take 1 Tablet by mouth daily. 90 Tablet 1    alcohol swabs padm Use as directed. Please test twice daily \"E11.9\" 200 Pad 3    atorvastatin (LIPITOR) 10 mg tablet TAKE 1 TABLET EVERY DAY 90 Tablet 1    glucose blood VI test strips (blood glucose test) strip (Brand True Metrix) Please test twice daily 100 Strip 3    glucose blood VI test strips (Pharmacist Choice) strip Test blood sugar twice daily \"E11.9\" 200 Strip 1    hydroCHLOROthiazide (HYDRODIURIL) 25 mg tablet TAKE ONE TABLET BY MOUTH ONCE DAILY 90 Tablet 1    losartan (COZAAR) 50 mg tablet TAKE 1 TABLET EVERY DAY 90 Tablet 1    glipiZIDE (GLUCOTROL) 5 mg tablet Take 1 Tablet by mouth two (2) times a day. 40 Tablet 0    True Metrix Glucose Meter misc Test 2-3 times daily 1 Each 0    esomeprazole (NEXIUM) 40 mg capsule TAKE 1 CAPSULE BY MOUTH ONCE DAILY 90 Cap 1    ketoconazole (NIZORAL) 2 % shampoo APPLY EXTERNALLY AS DIRECTED TO FEET LEGS ARMS 2 3 TIMES A WEEK      lancets misc Use as directed Please test twice daily \"E11.9\" 200 Each 11    selenium sulfide 2.5 % lotion Apply thin layer to the skin daily for 7 days  Indications: a fungal infection of the skin called tinea versicolor (Patient taking differently: daily as needed. Apply thin layer to the skin daily for 7 days  Indications: a fungal infection of the skin called tinea versicolor) 118 mL 0    azelastine (ASTELIN) 137 mcg (0.1 %) nasal spray 1 [place] by Both Nostrils route two (2) times a day. Use in each nostril as directed (Patient taking differently: 1 Spray by Both Nostrils route two (2) times daily as needed.  Use in each nostril as directed) 3 Bottle 1    Blood-Glucose Meter monitoring kit Test Once Daily in morning before breakfast \"E11.9\" 1 Kit 0    calcium carbonate (TUMS) 200 mg calcium (500 mg) chew Take 1 Tab by mouth daily. Allergies   Allergen Reactions    Paxil [Paroxetine Hcl] Other (comments)     Made patient feel jittery. Review of Systems, additional:  Pertinent items are noted in HPI. Objective:     Visit Vitals  /77   Pulse 65   Temp 98.3 °F (36.8 °C) (Temporal)   Resp 18   Ht 5' 3\" (1.6 m)   Wt 179 lb (81.2 kg)   SpO2 94%   BMI 31.71 kg/m²     Appearance: alert, well appearing, and in no distress. General exam: CVS exam BP noted to be well controlled today in office, S1, S2 normal, no gallop, no murmur, chest clear, no JVD, no HSM, no edema, diabetic exam feet: warm, good capillary refill. Lab review: orders written for new lab studies as appropriate; see orders. Assessment/Plan:     diabetes needs improvement, hypertension well controlled, hyperlipidemia reasonably well controlled. waiting for labs to retun to decide next steps. .   Diagnoses and all orders for this visit:    1. Medicare annual wellness visit, subsequent    2. Type 2 diabetes mellitus without complication, without long-term current use of insulin (HCC)  -     HEMOGLOBIN A1C WITH EAG; Future  -     METABOLIC PANEL, COMPREHENSIVE; Future  -     CBC WITH AUTOMATED DIFF; Future    3. Essential hypertension  -     METABOLIC PANEL, COMPREHENSIVE; Future  -     CBC WITH AUTOMATED DIFF; Future    4. Hyperlipidemia, unspecified hyperlipidemia type  -     METABOLIC PANEL, COMPREHENSIVE; Future  -     CBC WITH AUTOMATED DIFF; Future  -     LIPID PANEL; Future    5. Depression, unspecified depression type  -     sertraline (ZOLOFT) 50 mg tablet; Take 1 Tablet by mouth daily. 6. Grieving  -     sertraline (ZOLOFT) 50 mg tablet; Take 1 Tablet by mouth daily. 7. Encounter for screening colonoscopy  -     REFERRAL TO GASTROENTEROLOGY    We talked at length today about her grieving process.   I have encouraged the patient to get back on track with taking care of herself. Today we will increase her Zoloft 25 mg to 50 mg.  I anticipate with what she has shared that we will need to adjust her medications for her diabetes. I given the patient referral to see the gastroenterologist for a colonoscopy.

## 2021-07-16 ENCOUNTER — TELEPHONE (OUTPATIENT)
Dept: INTERNAL MEDICINE CLINIC | Age: 71
End: 2021-07-16

## 2021-07-18 ENCOUNTER — PATIENT MESSAGE (OUTPATIENT)
Dept: INTERNAL MEDICINE CLINIC | Age: 71
End: 2021-07-18

## 2021-07-19 RX ORDER — BLOOD-GLUCOSE METER
EACH MISCELLANEOUS
Qty: 1 EACH | Refills: 0 | Status: SHIPPED | OUTPATIENT
Start: 2021-07-19

## 2021-07-19 RX ORDER — LANCING DEVICE
EACH MISCELLANEOUS
Qty: 200 STRIP | Refills: 1 | Status: SHIPPED | OUTPATIENT
Start: 2021-07-19 | End: 2022-01-24 | Stop reason: SDUPTHER

## 2021-07-26 ENCOUNTER — TELEPHONE (OUTPATIENT)
Dept: INTERNAL MEDICINE CLINIC | Age: 71
End: 2021-07-26

## 2021-08-07 ENCOUNTER — PATIENT MESSAGE (OUTPATIENT)
Dept: INTERNAL MEDICINE CLINIC | Age: 71
End: 2021-08-07

## 2021-08-09 ENCOUNTER — TELEPHONE (OUTPATIENT)
Dept: INTERNAL MEDICINE CLINIC | Age: 71
End: 2021-08-09

## 2021-08-09 RX ORDER — KETOCONAZOLE 20 MG/ML
SHAMPOO TOPICAL
Qty: 120 ML | Refills: 1 | Status: SHIPPED | OUTPATIENT
Start: 2021-08-09 | End: 2021-08-18 | Stop reason: SDUPTHER

## 2021-08-18 NOTE — TELEPHONE ENCOUNTER
Reviewed pt chart  Medications had been filled prior to refill request: Jardiance 08- Cindy 08-09-21     Called pt to find out if refill was still needed   Pt did not answer   Left detailed voicemail for pt in regards to this matter

## 2021-08-20 RX ORDER — KETOCONAZOLE 20 MG/ML
SHAMPOO TOPICAL
Qty: 120 ML | Refills: 1 | Status: SHIPPED | OUTPATIENT
Start: 2021-08-20 | End: 2021-10-04

## 2021-09-08 ENCOUNTER — PATIENT MESSAGE (OUTPATIENT)
Dept: INTERNAL MEDICINE CLINIC | Age: 71
End: 2021-09-08

## 2021-09-08 NOTE — TELEPHONE ENCOUNTER
----- Message from Cory Cruz sent at 9/8/2021  8:34 AM EDT -----  Regarding: Test Results Question  Contact: [phone]  I need to be retested for covid and I'm having a problem getting an appointment need your help.

## 2021-09-08 NOTE — TELEPHONE ENCOUNTER
Spoke with patient .  Scheduled an appointment due to patient need to be tested for COVID in order to go back to work

## 2021-09-28 ENCOUNTER — TELEPHONE (OUTPATIENT)
Dept: INTERNAL MEDICINE CLINIC | Age: 71
End: 2021-09-28

## 2021-09-28 DIAGNOSIS — E11.9 TYPE 2 DIABETES MELLITUS WITHOUT COMPLICATION, WITHOUT LONG-TERM CURRENT USE OF INSULIN (HCC): ICD-10-CM

## 2021-09-28 RX ORDER — GLIPIZIDE 5 MG/1
5 TABLET ORAL 2 TIMES DAILY
Qty: 180 TABLET | Refills: 0 | Status: SHIPPED | OUTPATIENT
Start: 2021-09-28 | End: 2021-12-28 | Stop reason: SDUPTHER

## 2021-10-04 RX ORDER — KETOCONAZOLE 20 MG/ML
SHAMPOO TOPICAL
Qty: 120 ML | Refills: 1 | Status: SHIPPED | OUTPATIENT
Start: 2021-10-04 | End: 2021-11-22

## 2021-10-18 ENCOUNTER — PATIENT MESSAGE (OUTPATIENT)
Dept: INTERNAL MEDICINE CLINIC | Age: 71
End: 2021-10-18

## 2021-10-18 DIAGNOSIS — E78.5 HYPERLIPIDEMIA, UNSPECIFIED HYPERLIPIDEMIA TYPE: ICD-10-CM

## 2021-10-18 DIAGNOSIS — F32.A DEPRESSION, UNSPECIFIED DEPRESSION TYPE: ICD-10-CM

## 2021-10-18 DIAGNOSIS — F43.21 GRIEVING: ICD-10-CM

## 2021-10-18 DIAGNOSIS — I10 ESSENTIAL HYPERTENSION: ICD-10-CM

## 2021-10-18 RX ORDER — SERTRALINE HYDROCHLORIDE 50 MG/1
50 TABLET, FILM COATED ORAL DAILY
Qty: 90 TABLET | Refills: 1 | Status: SHIPPED | OUTPATIENT
Start: 2021-10-18 | End: 2022-01-24 | Stop reason: SDUPTHER

## 2021-10-18 RX ORDER — LOSARTAN POTASSIUM 50 MG/1
TABLET ORAL
Qty: 90 TABLET | Refills: 1 | Status: SHIPPED | OUTPATIENT
Start: 2021-10-18 | End: 2022-07-18 | Stop reason: SDUPTHER

## 2021-10-18 RX ORDER — HYDROCHLOROTHIAZIDE 25 MG/1
TABLET ORAL
Qty: 90 TABLET | Refills: 1 | Status: SHIPPED | OUTPATIENT
Start: 2021-10-18 | End: 2022-07-18 | Stop reason: SDUPTHER

## 2021-10-18 RX ORDER — ATORVASTATIN CALCIUM 10 MG/1
TABLET, FILM COATED ORAL
Qty: 90 TABLET | Refills: 1 | Status: SHIPPED | OUTPATIENT
Start: 2021-10-18 | End: 2022-07-18 | Stop reason: SDUPTHER

## 2021-10-18 NOTE — TELEPHONE ENCOUNTER
----- Message from Cory Cruz sent at 10/18/2021 10:09 AM EDT -----  Regarding: Prescription Question  Contact: [phone]  Good Morning ! Ms. Christine Reed I need refills[de-identified] Hydrochlorothiazide,SERTRALINE, ATORVASTATIN, an Losartan. Have enough until OCT. 27th !!  Thank José Miguel Russ

## 2021-10-18 NOTE — TELEPHONE ENCOUNTER
Future Appointments:  No future appointments. Last Appointment With Me:  7/12/2021     Requested Prescriptions     Pending Prescriptions Disp Refills    hydroCHLOROthiazide (HYDRODIURIL) 25 mg tablet 90 Tablet 1     Sig: TAKE ONE TABLET BY MOUTH ONCE DAILY    sertraline (ZOLOFT) 50 mg tablet 90 Tablet 1     Sig: Take 1 Tablet by mouth daily.     losartan (COZAAR) 50 mg tablet 90 Tablet 1     Sig: TAKE 1 TABLET EVERY DAY    atorvastatin (LIPITOR) 10 mg tablet 90 Tablet 1     Sig: TAKE 1 TABLET EVERY DAY

## 2021-10-22 ENCOUNTER — TRANSCRIBE ORDER (OUTPATIENT)
Dept: SCHEDULING | Age: 71
End: 2021-10-22

## 2021-10-22 DIAGNOSIS — Z12.31 SCREENING MAMMOGRAM FOR HIGH-RISK PATIENT: Primary | ICD-10-CM

## 2021-11-22 RX ORDER — KETOCONAZOLE 20 MG/ML
SHAMPOO TOPICAL
Qty: 120 ML | Refills: 1 | Status: SHIPPED | OUTPATIENT
Start: 2021-11-22

## 2021-12-01 ENCOUNTER — HOSPITAL ENCOUNTER (OUTPATIENT)
Dept: MAMMOGRAPHY | Age: 71
Discharge: HOME | End: 2021-12-01
Attending: PHYSICIAN ASSISTANT
Payer: MEDICARE

## 2021-12-01 DIAGNOSIS — Z12.31 SCREENING MAMMOGRAM FOR HIGH-RISK PATIENT: ICD-10-CM

## 2021-12-01 PROCEDURE — 77067 SCR MAMMO BI INCL CAD: CPT

## 2021-12-28 DIAGNOSIS — E11.9 TYPE 2 DIABETES MELLITUS WITHOUT COMPLICATION, WITHOUT LONG-TERM CURRENT USE OF INSULIN (HCC): ICD-10-CM

## 2021-12-28 RX ORDER — GLIPIZIDE 5 MG/1
5 TABLET ORAL 2 TIMES DAILY
Qty: 180 TABLET | Refills: 0 | Status: SHIPPED | OUTPATIENT
Start: 2021-12-28 | End: 2022-03-30

## 2021-12-28 NOTE — TELEPHONE ENCOUNTER
----- Message from Cory Cruz sent at 12/28/2021  3:37 PM EST -----  Regarding: Prescription Question  Contact: [phone]  Shaan Modi!! I need you to send in order to [facility] for glipizide for me. Runs out Sunday Thank You!  Deng New

## 2022-01-24 ENCOUNTER — OFFICE VISIT (OUTPATIENT)
Dept: INTERNAL MEDICINE CLINIC | Age: 72
End: 2022-01-24
Payer: MEDICARE

## 2022-01-24 VITALS
SYSTOLIC BLOOD PRESSURE: 125 MMHG | HEIGHT: 63 IN | BODY MASS INDEX: 32.25 KG/M2 | TEMPERATURE: 98 F | DIASTOLIC BLOOD PRESSURE: 71 MMHG | WEIGHT: 182 LBS | RESPIRATION RATE: 20 BRPM | HEART RATE: 81 BPM | OXYGEN SATURATION: 97 %

## 2022-01-24 DIAGNOSIS — E11.9 TYPE 2 DIABETES MELLITUS WITHOUT COMPLICATION, WITHOUT LONG-TERM CURRENT USE OF INSULIN (HCC): Primary | ICD-10-CM

## 2022-01-24 DIAGNOSIS — E78.5 HYPERLIPIDEMIA, UNSPECIFIED HYPERLIPIDEMIA TYPE: ICD-10-CM

## 2022-01-24 DIAGNOSIS — I10 ESSENTIAL HYPERTENSION: ICD-10-CM

## 2022-01-24 DIAGNOSIS — F43.21 GRIEVING: ICD-10-CM

## 2022-01-24 DIAGNOSIS — F32.A DEPRESSION, UNSPECIFIED DEPRESSION TYPE: ICD-10-CM

## 2022-01-24 PROCEDURE — 3046F HEMOGLOBIN A1C LEVEL >9.0%: CPT | Performed by: PHYSICIAN ASSISTANT

## 2022-01-24 PROCEDURE — G8432 DEP SCR NOT DOC, RNG: HCPCS | Performed by: PHYSICIAN ASSISTANT

## 2022-01-24 PROCEDURE — G8399 PT W/DXA RESULTS DOCUMENT: HCPCS | Performed by: PHYSICIAN ASSISTANT

## 2022-01-24 PROCEDURE — 99214 OFFICE O/P EST MOD 30 MIN: CPT | Performed by: PHYSICIAN ASSISTANT

## 2022-01-24 PROCEDURE — G8752 SYS BP LESS 140: HCPCS | Performed by: PHYSICIAN ASSISTANT

## 2022-01-24 PROCEDURE — 1090F PRES/ABSN URINE INCON ASSESS: CPT | Performed by: PHYSICIAN ASSISTANT

## 2022-01-24 PROCEDURE — G8427 DOCREV CUR MEDS BY ELIG CLIN: HCPCS | Performed by: PHYSICIAN ASSISTANT

## 2022-01-24 PROCEDURE — 2022F DILAT RTA XM EVC RTNOPTHY: CPT | Performed by: PHYSICIAN ASSISTANT

## 2022-01-24 PROCEDURE — G8536 NO DOC ELDER MAL SCRN: HCPCS | Performed by: PHYSICIAN ASSISTANT

## 2022-01-24 PROCEDURE — G8754 DIAS BP LESS 90: HCPCS | Performed by: PHYSICIAN ASSISTANT

## 2022-01-24 PROCEDURE — G9899 SCRN MAM PERF RSLTS DOC: HCPCS | Performed by: PHYSICIAN ASSISTANT

## 2022-01-24 PROCEDURE — G8417 CALC BMI ABV UP PARAM F/U: HCPCS | Performed by: PHYSICIAN ASSISTANT

## 2022-01-24 PROCEDURE — 1101F PT FALLS ASSESS-DOCD LE1/YR: CPT | Performed by: PHYSICIAN ASSISTANT

## 2022-01-24 PROCEDURE — 3017F COLORECTAL CA SCREEN DOC REV: CPT | Performed by: PHYSICIAN ASSISTANT

## 2022-01-24 RX ORDER — SERTRALINE HYDROCHLORIDE 50 MG/1
50 TABLET, FILM COATED ORAL DAILY
Qty: 90 TABLET | Refills: 1 | Status: SHIPPED | OUTPATIENT
Start: 2022-01-24 | End: 2022-04-29 | Stop reason: SDUPTHER

## 2022-01-24 RX ORDER — LANCETS
EACH MISCELLANEOUS
Qty: 200 EACH | Refills: 11 | Status: SHIPPED | OUTPATIENT
Start: 2022-01-24 | End: 2022-07-18 | Stop reason: SDUPTHER

## 2022-01-24 RX ORDER — LANCING DEVICE
EACH MISCELLANEOUS
Qty: 200 STRIP | Refills: 1 | Status: SHIPPED | OUTPATIENT
Start: 2022-01-24

## 2022-01-24 RX ORDER — ISOPROPYL ALCOHOL 70 ML/100ML
SWAB TOPICAL
Qty: 200 PAD | Refills: 3 | Status: SHIPPED | OUTPATIENT
Start: 2022-01-24 | End: 2022-07-18 | Stop reason: SDUPTHER

## 2022-01-24 NOTE — PROGRESS NOTES
1. Have you been to the ER, urgent care clinic since your last visit? Hospitalized since your last visit? No    2. Have you seen or consulted any other health care providers outside of the [address] [place] [address] since your last visit? Include any pap smears or colon screening. No    Health Maintenance Due   Topic Date Due    Eye Exam Retinal or Dilated  Never done    Shingrix Vaccine Age 50> (1 of 2) Never done    Colorectal Cancer Screening Combo  06/17/2019    MICROALBUMIN Q1  07/23/2021    Flu Vaccine (1) 09/01/2021    Foot Exam Q1  09/29/2021    A1C test (Diabetic or Prediabetic)  10/12/2021     Patient here today for routine visit, diabetes check.

## 2022-01-24 NOTE — PROGRESS NOTES
Subjective:     Mikayla Vásquez is a 66 y.o. female seen for follow up of diabetes. She also has diabetes and hypertension. Diabetic Review of Systems - medication compliance: compliant all of the time, diabetic diet compliance: noncompliant some of the time. Other symptoms and concerns: patient reports she has tried to do better with her diet. She admits she likes to snack in the evenings. She has not been exercising due to the cold weather. 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 She reports she was home for a period of time before starting back to work. Allergies   Allergen Reactions    Paxil [Paroxetine Hcl] Other (comments)     Made patient feel jittery. Review of Systems  Pertinent items are noted in HPI. Objective:     Visit Vitals  /71   Pulse 81   Temp 98 °F (36.7 °C) (Temporal)   Resp 20   Ht 5' 3\" (1.6 m)   Wt 182 lb (82.6 kg)   SpO2 97%   BMI 32.24 kg/m²     Appearance: alert, well appearing, and in no distress. Exam: heart sounds normal rate and regular rhythm, no murmurs noted, chest clear, feet: warm, good capillary refill and normal sensory exam  Lab review: orders written for new lab studies as appropriate; see orders. Assessment/Plan:     diabetes waiting on labs, hypertension well controlled. Diabetic issues reviewed with her: low cholesterol diet, weight control and daily exercise discussed. Diagnoses and all orders for this visit:    1. Type 2 diabetes mellitus without complication, without long-term current use of insulin (HCC)  -     CBC WITH AUTOMATED DIFF; Future  -     MICROALBUMIN, UR, RAND W/ MICROALB/CREAT RATIO; Future  -     HEMOGLOBIN A1C WITH EAG; Future  -     LIPID PANEL; Future  -     METABOLIC PANEL, COMPREHENSIVE; Future  -     alcohol swabs padm; Use as directed.   Please test twice daily \"E11.9\"  -     lancets misc; Use as directed Please test twice daily \"E11.9\"    2. Essential hypertension  -     CBC WITH AUTOMATED DIFF; Future    3. Hyperlipidemia, unspecified hyperlipidemia type  -     CBC WITH AUTOMATED DIFF; Future  -     LIPID PANEL; Future    4. Depression, unspecified depression type  -     sertraline (ZOLOFT) 50 mg tablet; Take 1 Tablet by mouth daily. 5. Grieving  -     sertraline (ZOLOFT) 50 mg tablet; Take 1 Tablet by mouth daily. Other orders  -     glucose blood VI test strips (Pharmacist Choice) strip; Test blood sugar twice daily \"E11.9\"    we talked extensively about diet and exercise. She understands what she should be doing but admits it is hard to stay on track. She reports she is going to try to do better.

## 2022-03-18 PROBLEM — K85.10 ACUTE GALLSTONE PANCREATITIS: Status: ACTIVE | Noted: 2020-04-10

## 2022-03-18 PROBLEM — M79.673 FOOT PAIN: Status: ACTIVE | Noted: 2020-04-10

## 2022-03-19 PROBLEM — K81.0 ACUTE EMPHYSEMATOUS CHOLECYSTITIS: Status: ACTIVE | Noted: 2020-04-10

## 2022-03-19 PROBLEM — B37.31 CANDIDAL VAGINITIS: Status: ACTIVE | Noted: 2020-04-10

## 2022-03-19 PROBLEM — M72.2 PLANTAR FASCIA SYNDROME: Status: ACTIVE | Noted: 2020-04-10

## 2022-03-19 PROBLEM — L25.0 CONTACT DERMATITIS DUE TO COSMETICS: Status: ACTIVE | Noted: 2020-04-10

## 2022-03-19 PROBLEM — R73.9 ACUTE HYPERGLYCEMIA: Status: ACTIVE | Noted: 2020-04-10

## 2022-03-30 DIAGNOSIS — E11.9 TYPE 2 DIABETES MELLITUS WITHOUT COMPLICATION, WITHOUT LONG-TERM CURRENT USE OF INSULIN (HCC): ICD-10-CM

## 2022-03-30 RX ORDER — EMPAGLIFLOZIN 10 MG/1
TABLET, FILM COATED ORAL
Qty: 90 TABLET | Refills: 1 | Status: SHIPPED | OUTPATIENT
Start: 2022-03-30 | End: 2022-06-10 | Stop reason: SDUPTHER

## 2022-03-30 RX ORDER — GLIPIZIDE 5 MG/1
TABLET ORAL
Qty: 180 TABLET | Refills: 0 | Status: SHIPPED | OUTPATIENT
Start: 2022-03-30 | End: 2022-07-05 | Stop reason: SDUPTHER

## 2022-04-29 ENCOUNTER — PATIENT MESSAGE (OUTPATIENT)
Dept: INTERNAL MEDICINE CLINIC | Age: 72
End: 2022-04-29

## 2022-04-29 DIAGNOSIS — F43.21 GRIEVING: ICD-10-CM

## 2022-04-29 DIAGNOSIS — F32.A DEPRESSION, UNSPECIFIED DEPRESSION TYPE: ICD-10-CM

## 2022-04-29 RX ORDER — SERTRALINE HYDROCHLORIDE 50 MG/1
50 TABLET, FILM COATED ORAL DAILY
Qty: 90 TABLET | Refills: 1 | Status: SHIPPED | OUTPATIENT
Start: 2022-04-29 | End: 2022-07-18 | Stop reason: SDUPTHER

## 2022-06-13 NOTE — TELEPHONE ENCOUNTER
Future Appointments:  Future Appointments   Date Time Provider Wyatt Angelique   2022  8:00 AM SIENNA Rios BS AMB        Last Appointment With Me:  Visit date not found     Requested Prescriptions     Pending Prescriptions Disp Refills    empagliflozin (Jardiance) 10 mg tablet 90 Tablet 1     Si Tablet daily.

## 2022-07-05 ENCOUNTER — PATIENT MESSAGE (OUTPATIENT)
Dept: INTERNAL MEDICINE CLINIC | Age: 72
End: 2022-07-05

## 2022-07-05 DIAGNOSIS — E11.9 TYPE 2 DIABETES MELLITUS WITHOUT COMPLICATION, WITHOUT LONG-TERM CURRENT USE OF INSULIN (HCC): ICD-10-CM

## 2022-07-05 RX ORDER — GLIPIZIDE 5 MG/1
5 TABLET ORAL 2 TIMES DAILY
Qty: 28 TABLET | Refills: 0 | Status: SHIPPED | OUTPATIENT
Start: 2022-07-05

## 2022-07-05 RX ORDER — GLIPIZIDE 5 MG/1
5 TABLET ORAL 2 TIMES DAILY
Qty: 180 TABLET | Refills: 0 | Status: SHIPPED | OUTPATIENT
Start: 2022-07-05 | End: 2022-10-18 | Stop reason: SDUPTHER

## 2022-07-05 NOTE — TELEPHONE ENCOUNTER
From: Amberly Cruz  To: Alhaji Govea PA-C  Sent: 7/5/2022 11:04 AM EDT  Subject: Prescription Question    Good morning! Dr. Irlanda Jones I placed a order for my meds (glipizide) I need your approval to get it. Have enough for two days. .. Jass Dodge Also, I will need you to send prescriptions to [facility] for me to get a 7 day supply (14) pills until I receive my refill. Please!! Thank You!

## 2022-07-17 ENCOUNTER — PATIENT MESSAGE (OUTPATIENT)
Dept: INTERNAL MEDICINE CLINIC | Age: 72
End: 2022-07-17

## 2022-07-17 DIAGNOSIS — F43.21 GRIEVING: ICD-10-CM

## 2022-07-17 DIAGNOSIS — F32.A DEPRESSION, UNSPECIFIED DEPRESSION TYPE: ICD-10-CM

## 2022-07-17 DIAGNOSIS — E78.5 HYPERLIPIDEMIA, UNSPECIFIED HYPERLIPIDEMIA TYPE: ICD-10-CM

## 2022-07-17 DIAGNOSIS — E11.9 TYPE 2 DIABETES MELLITUS WITHOUT COMPLICATION, WITHOUT LONG-TERM CURRENT USE OF INSULIN (HCC): ICD-10-CM

## 2022-07-17 DIAGNOSIS — I10 ESSENTIAL HYPERTENSION: ICD-10-CM

## 2022-07-18 RX ORDER — ATORVASTATIN CALCIUM 10 MG/1
TABLET, FILM COATED ORAL
Qty: 90 TABLET | Refills: 0 | Status: SHIPPED | OUTPATIENT
Start: 2022-07-18 | End: 2022-09-02 | Stop reason: SDUPTHER

## 2022-07-18 RX ORDER — HYDROCHLOROTHIAZIDE 25 MG/1
TABLET ORAL
Qty: 90 TABLET | Refills: 0 | Status: SHIPPED | OUTPATIENT
Start: 2022-07-18 | End: 2022-09-06 | Stop reason: SDUPTHER

## 2022-07-18 RX ORDER — LANCING DEVICE
EACH MISCELLANEOUS
Qty: 200 STRIP | Refills: 0 | Status: SHIPPED | OUTPATIENT
Start: 2022-07-18

## 2022-07-18 RX ORDER — LANCETS
EACH MISCELLANEOUS
Qty: 200 EACH | Refills: 0 | Status: SHIPPED | OUTPATIENT
Start: 2022-07-18

## 2022-07-18 RX ORDER — SERTRALINE HYDROCHLORIDE 50 MG/1
50 TABLET, FILM COATED ORAL DAILY
Qty: 90 TABLET | Refills: 0 | Status: SHIPPED | OUTPATIENT
Start: 2022-07-18 | End: 2022-09-06 | Stop reason: SDUPTHER

## 2022-07-18 RX ORDER — ISOPROPYL ALCOHOL 70 ML/100ML
SWAB TOPICAL
Qty: 200 PAD | Refills: 0 | Status: SHIPPED | OUTPATIENT
Start: 2022-07-18 | End: 2022-09-06 | Stop reason: SDUPTHER

## 2022-07-18 RX ORDER — LOSARTAN POTASSIUM 50 MG/1
TABLET ORAL
Qty: 90 TABLET | Refills: 0 | Status: SHIPPED | OUTPATIENT
Start: 2022-07-18 | End: 2022-09-06 | Stop reason: SDUPTHER

## 2022-07-18 NOTE — TELEPHONE ENCOUNTER
Future Appointments:  Future Appointments   Date Time Provider Wyatt Merino   8/24/2022 10:45 AM SIENNA Zheng BS AMB        Last Appointment With Me:  Visit date not found     Requested Prescriptions     Pending Prescriptions Disp Refills    alcohol swabs padm 200 Pad 3     Sig: Use as directed. Please test twice daily \"E11.9\"    atorvastatin (LIPITOR) 10 mg tablet 90 Tablet 1     Sig: TAKE 1 TABLET EVERY DAY    hydroCHLOROthiazide (HYDRODIURIL) 25 mg tablet 90 Tablet 1     Sig: TAKE ONE TABLET BY MOUTH ONCE DAILY    sertraline (ZOLOFT) 50 mg tablet 90 Tablet 1     Sig: Take 1 Tablet by mouth daily.     losartan (COZAAR) 50 mg tablet 90 Tablet 1     Sig: TAKE 1 TABLET EVERY DAY    glucose blood VI test strips (Pharmacist Choice) strip 200 Strip 1     Sig: Test blood sugar twice daily \"E11.9\"    lancets misc 200 Each 11     Sig: Use as directed Please test twice daily \"E11.9\"

## 2022-09-02 DIAGNOSIS — E78.5 HYPERLIPIDEMIA, UNSPECIFIED HYPERLIPIDEMIA TYPE: ICD-10-CM

## 2022-09-02 RX ORDER — ATORVASTATIN CALCIUM 10 MG/1
TABLET, FILM COATED ORAL
Qty: 90 TABLET | Refills: 0 | Status: SHIPPED | OUTPATIENT
Start: 2022-09-02

## 2022-09-02 NOTE — TELEPHONE ENCOUNTER
Fax from pharmacy states that the patient is also requesting refills for the drugs below.     [employer] True Metrix Test Strip    Trueplus Ultra Thin 30G Lancet

## 2022-09-06 ENCOUNTER — TELEPHONE (OUTPATIENT)
Dept: INTERNAL MEDICINE CLINIC | Age: 72
End: 2022-09-06

## 2022-09-06 DIAGNOSIS — F32.A DEPRESSION, UNSPECIFIED DEPRESSION TYPE: ICD-10-CM

## 2022-09-06 DIAGNOSIS — E11.9 TYPE 2 DIABETES MELLITUS WITHOUT COMPLICATION, WITHOUT LONG-TERM CURRENT USE OF INSULIN (HCC): ICD-10-CM

## 2022-09-06 DIAGNOSIS — F43.21 GRIEVING: ICD-10-CM

## 2022-09-06 DIAGNOSIS — I10 ESSENTIAL HYPERTENSION: ICD-10-CM

## 2022-09-06 RX ORDER — ISOPROPYL ALCOHOL 70 ML/100ML
SWAB TOPICAL
Qty: 200 PAD | Refills: 3 | Status: SHIPPED | OUTPATIENT
Start: 2022-09-06

## 2022-09-07 ENCOUNTER — TELEPHONE (OUTPATIENT)
Dept: INTERNAL MEDICINE CLINIC | Age: 72
End: 2022-09-07

## 2022-09-07 RX ORDER — HYDROCHLOROTHIAZIDE 25 MG/1
TABLET ORAL
Qty: 90 TABLET | Refills: 0 | Status: SHIPPED | OUTPATIENT
Start: 2022-09-07

## 2022-09-07 RX ORDER — LOSARTAN POTASSIUM 50 MG/1
TABLET ORAL
Qty: 90 TABLET | Refills: 0 | Status: SHIPPED | OUTPATIENT
Start: 2022-09-07

## 2022-09-07 RX ORDER — SERTRALINE HYDROCHLORIDE 50 MG/1
50 TABLET, FILM COATED ORAL DAILY
Qty: 90 TABLET | Refills: 0 | Status: SHIPPED | OUTPATIENT
Start: 2022-09-07

## 2022-09-07 NOTE — TELEPHONE ENCOUNTER
1 fill only. Former patient of Molly, and needs to establish with a new PCP. Recommend  or [facility], as they're trying to build their patient panels.

## 2022-10-18 DIAGNOSIS — E11.9 TYPE 2 DIABETES MELLITUS WITHOUT COMPLICATION, WITHOUT LONG-TERM CURRENT USE OF INSULIN (HCC): ICD-10-CM

## 2022-10-18 RX ORDER — GLIPIZIDE 5 MG/1
5 TABLET ORAL 2 TIMES DAILY
Qty: 180 TABLET | Refills: 0 | Status: SHIPPED | OUTPATIENT
Start: 2022-10-18

## 2022-10-18 NOTE — TELEPHONE ENCOUNTER
PCP: Shravan Luong PA-C    Last appt: 1/24/2022  No future appointments. Requested Prescriptions     Pending Prescriptions Disp Refills    glipiZIDE (GLUCOTROL) 5 mg tablet 180 Tablet 0     Sig: Take 1 Tablet by mouth two (2) times a day.

## 2022-10-21 ENCOUNTER — TELEPHONE (OUTPATIENT)
Dept: INTERNAL MEDICINE CLINIC | Age: 72
End: 2022-10-21

## 2022-10-21 NOTE — TELEPHONE ENCOUNTER
LVM for pt to schedule appointment. Reason for Call: New Patient/New to Provider Appointment needed: Routine   Medicare AWV     QUESTIONS     Reason for appointment request? No appointments available during search       Additional Information for Provider? Patient would like to establish care   with Dania Owens no available appointments populated please call patient   to schedule an appointment.

## 2022-11-01 ENCOUNTER — TRANSCRIBE ORDER (OUTPATIENT)
Dept: SCHEDULING | Age: 72
End: 2022-11-01

## 2022-11-01 DIAGNOSIS — Z12.31 VISIT FOR SCREENING MAMMOGRAM: Primary | ICD-10-CM

## 2022-12-01 ENCOUNTER — TELEPHONE (OUTPATIENT)
Dept: INTERNAL MEDICINE CLINIC | Age: 72
End: 2022-12-01

## 2022-12-01 NOTE — TELEPHONE ENCOUNTER
Pt called stating that she is changing providers to a provider that is closer to her house. She states that she faxed over a medical request form about 3 weeks ago so that her medical history can be transferred to her new PCP but she states they have still not received any documents. Advised pt to let her new PCP office know to re-fax this request form and we will take care of this.

## 2022-12-09 ENCOUNTER — HOSPITAL ENCOUNTER (OUTPATIENT)
Dept: MAMMOGRAPHY | Age: 72
End: 2022-12-09
Attending: NURSE PRACTITIONER
Payer: MEDICARE

## 2022-12-09 DIAGNOSIS — Z12.31 VISIT FOR SCREENING MAMMOGRAM: ICD-10-CM

## 2022-12-09 PROCEDURE — 77067 SCR MAMMO BI INCL CAD: CPT

## 2023-05-24 RX ORDER — ATORVASTATIN CALCIUM 10 MG/1
1 TABLET, FILM COATED ORAL DAILY
COMMUNITY
Start: 2022-09-02

## 2023-05-24 RX ORDER — GLIPIZIDE 5 MG/1
5 TABLET ORAL 2 TIMES DAILY
COMMUNITY
Start: 2022-07-05

## 2023-05-24 RX ORDER — KETOCONAZOLE 20 MG/ML
SHAMPOO TOPICAL
COMMUNITY
Start: 2021-11-22

## 2023-05-24 RX ORDER — AZELASTINE 1 MG/ML
1 SPRAY, METERED NASAL 2 TIMES DAILY
COMMUNITY
Start: 2020-04-13

## 2023-05-24 RX ORDER — ESOMEPRAZOLE MAGNESIUM 40 MG/1
1 CAPSULE, DELAYED RELEASE ORAL DAILY
COMMUNITY
Start: 2021-03-25

## 2023-05-24 RX ORDER — HYDROCHLOROTHIAZIDE 25 MG/1
1 TABLET ORAL DAILY
COMMUNITY
Start: 2022-09-07

## 2023-05-24 RX ORDER — LOSARTAN POTASSIUM 50 MG/1
1 TABLET ORAL DAILY
COMMUNITY
Start: 2022-09-07

## 2023-05-24 RX ORDER — UREA 10 %
1 LOTION (ML) TOPICAL DAILY
COMMUNITY

## 2023-05-24 RX ORDER — LANCETS 30 GAUGE
EACH MISCELLANEOUS
COMMUNITY
Start: 2022-07-18

## 2023-05-24 RX ORDER — SELENIUM SULFIDE 2.5 MG/100ML
LOTION TOPICAL
COMMUNITY
Start: 2020-09-29

## 2023-09-11 RX ORDER — ISOPROPYL ALCOHOL 70 ML/100ML
SWAB TOPICAL
OUTPATIENT
Start: 2023-09-11

## 2023-10-17 ENCOUNTER — ANESTHESIA EVENT (OUTPATIENT)
Facility: HOSPITAL | Age: 73
End: 2023-10-17
Payer: MEDICARE

## 2023-10-17 ENCOUNTER — HOSPITAL ENCOUNTER (OUTPATIENT)
Facility: HOSPITAL | Age: 73
Setting detail: OUTPATIENT SURGERY
Discharge: HOME | End: 2023-10-17
Attending: INTERNAL MEDICINE | Admitting: INTERNAL MEDICINE
Payer: MEDICARE

## 2023-10-17 ENCOUNTER — ANESTHESIA (OUTPATIENT)
Facility: HOSPITAL | Age: 73
End: 2023-10-17
Payer: MEDICARE

## 2023-10-17 VITALS
OXYGEN SATURATION: 98 % | TEMPERATURE: 97.3 F | BODY MASS INDEX: 30.11 KG/M2 | RESPIRATION RATE: 17 BRPM | HEART RATE: 67 BPM | WEIGHT: 170 LBS | SYSTOLIC BLOOD PRESSURE: 100 MMHG | DIASTOLIC BLOOD PRESSURE: 55 MMHG

## 2023-10-17 PROCEDURE — 3600007502: Performed by: INTERNAL MEDICINE

## 2023-10-17 PROCEDURE — 88305 TISSUE EXAM BY PATHOLOGIST: CPT

## 2023-10-17 PROCEDURE — 3600007512: Performed by: INTERNAL MEDICINE

## 2023-10-17 PROCEDURE — 6360000002 HC RX W HCPCS: Performed by: NURSE ANESTHETIST, CERTIFIED REGISTERED

## 2023-10-17 PROCEDURE — 7100000011 HC PHASE II RECOVERY - ADDTL 15 MIN: Performed by: INTERNAL MEDICINE

## 2023-10-17 PROCEDURE — 3700000000 HC ANESTHESIA ATTENDED CARE: Performed by: INTERNAL MEDICINE

## 2023-10-17 PROCEDURE — 2709999900 HC NON-CHARGEABLE SUPPLY: Performed by: INTERNAL MEDICINE

## 2023-10-17 PROCEDURE — 7100000010 HC PHASE II RECOVERY - FIRST 15 MIN: Performed by: INTERNAL MEDICINE

## 2023-10-17 PROCEDURE — 3700000001 HC ADD 15 MINUTES (ANESTHESIA): Performed by: INTERNAL MEDICINE

## 2023-10-17 PROCEDURE — 2580000003 HC RX 258: Performed by: INTERNAL MEDICINE

## 2023-10-17 RX ORDER — SODIUM CHLORIDE 0.9 % (FLUSH) 0.9 %
5-40 SYRINGE (ML) INJECTION PRN
Status: DISCONTINUED | OUTPATIENT
Start: 2023-10-17 | End: 2023-10-17 | Stop reason: HOSPADM

## 2023-10-17 RX ORDER — PHENYLEPHRINE HCL IN 0.9% NACL 0.4MG/10ML
SYRINGE (ML) INTRAVENOUS PRN
Status: DISCONTINUED | OUTPATIENT
Start: 2023-10-17 | End: 2023-10-17 | Stop reason: SDUPTHER

## 2023-10-17 RX ORDER — SODIUM CHLORIDE 9 MG/ML
25 INJECTION, SOLUTION INTRAVENOUS PRN
Status: DISCONTINUED | OUTPATIENT
Start: 2023-10-17 | End: 2023-10-17 | Stop reason: HOSPADM

## 2023-10-17 RX ORDER — SODIUM CHLORIDE 0.9 % (FLUSH) 0.9 %
5-40 SYRINGE (ML) INJECTION EVERY 12 HOURS SCHEDULED
Status: DISCONTINUED | OUTPATIENT
Start: 2023-10-17 | End: 2023-10-17 | Stop reason: HOSPADM

## 2023-10-17 RX ORDER — SODIUM CHLORIDE 9 MG/ML
INJECTION, SOLUTION INTRAVENOUS CONTINUOUS
Status: DISCONTINUED | OUTPATIENT
Start: 2023-10-17 | End: 2023-10-17 | Stop reason: HOSPADM

## 2023-10-17 RX ADMIN — PROPOFOL 50 MG: 10 INJECTION, EMULSION INTRAVENOUS at 09:45

## 2023-10-17 RX ADMIN — PROPOFOL 20 MG: 10 INJECTION, EMULSION INTRAVENOUS at 09:55

## 2023-10-17 RX ADMIN — PROPOFOL 20 MG: 10 INJECTION, EMULSION INTRAVENOUS at 10:00

## 2023-10-17 RX ADMIN — PROPOFOL 50 MG: 10 INJECTION, EMULSION INTRAVENOUS at 09:49

## 2023-10-17 RX ADMIN — Medication 80 MCG: at 09:51

## 2023-10-17 RX ADMIN — SODIUM CHLORIDE: 9 INJECTION, SOLUTION INTRAVENOUS at 09:31

## 2023-10-17 RX ADMIN — PROPOFOL 20 MG: 10 INJECTION, EMULSION INTRAVENOUS at 10:04

## 2023-10-17 ASSESSMENT — PAIN SCALES - GENERAL: PAINLEVEL_OUTOF10: 0

## 2023-10-17 NOTE — ANESTHESIA POSTPROCEDURE EVALUATION
Department of Anesthesiology  Postprocedure Note    Patient: Tammy Hammer  MRN: [id number]  YOB: 1950  Date of evaluation: 10/17/2023      Procedure Summary     Date: 10/17/23 Room / Location: [facility] ENDO 02 / [facility] ENDOSCOPY    Anesthesia Start: 4395 Anesthesia Stop: 1013    Procedure: COLONOSCOPY WITH BIOPSY Diagnosis:       Colon cancer screening      (Colon cancer screening [Z12.11])    Surgeons: Natalie Blanco MD Responsible Provider: Bety Jiménez MD    Anesthesia Type: MAC ASA Status: 2          Anesthesia Type: MAC    Mulu Phase I:      Mulu Phase II: Mulu Score: 10      Anesthesia Post Evaluation    Patient location during evaluation: PACU  Patient participation: complete - patient participated  Level of consciousness: awake  Pain score: 2  Airway patency: patent  Nausea & Vomiting: no nausea  Complications: no  Cardiovascular status: blood pressure returned to baseline  Respiratory status: acceptable  Hydration status: euvolemic  Pain management: adequate

## 2023-10-17 NOTE — H&P
[facility]  [address], [address] [facility]      History and Physical       NAME:  Clive Huber   :   1950   MRN:   [id number]             History of Present Illness:  Patient is a 67 y.o. who is seen for screening colonoscopy . PMH:  Past Medical History:   Diagnosis Date    Diabetes ([address])     Hypercholesterolemia     Hypertension     Menopause        PSH:  Past Surgical History:   Procedure Laterality Date    COLONOSCOPY      GYN      ovarian cyst    GYN      ectopic pregnancy x 2    HYSTERECTOMY, TOTAL ABDOMINAL (CERVIX REMOVED)  1980    early s    OVARY REMOVAL         Allergies: Allergies   Allergen Reactions    Paroxetine Hcl Other (See Comments)     Made patient feel jittery. Home Medications:  Prior to Admission Medications   Prescriptions Last Dose Informant Patient Reported? Taking?    Lancets MISC 10/17/2023  Yes No   Sig: Use as directed Please test twice daily \"E11.9\"   atorvastatin (LIPITOR) 10 MG tablet 10/17/2023  Yes No   Sig: Take 1 tablet by mouth daily   azelastine (ASTELIN) 0.1 % nasal spray Past Week  Yes No   Si spray by Nasal route 2 times daily   calcium carbonate (OS-MICHAEL) 1250 (500 Ca) MG chewable tablet Not Taking  Yes No   Sig: Take 1 tablet by mouth daily   Patient not taking: Reported on 10/17/2023   empagliflozin (JARDIANCE) 10 MG tablet 10/17/2023  Yes No   Sig: Take 1 tablet by mouth daily   esomeprazole (NEXIUM) 40 MG delayed release capsule Past Month  Yes No   Sig: Take 1 capsule by mouth daily   glipiZIDE (GLUCOTROL) 5 MG tablet 10/17/2023  Yes No   Sig: Take 1 tablet by mouth 2 times daily   hydroCHLOROthiazide (HYDRODIURIL) 25 MG tablet 10/17/2023  Yes No   Sig: Take 1 tablet by mouth daily   ketoconazole (NIZORAL) 2 % shampoo Past Month  Yes No   Sig: APPLY EXTERNALLY AS DIRECTED TO AFFECTED AREA(S) OF FEET LEGS ARMS 2 TO 3 TIMES A WEEK   losartan (COZAAR) 50 MG tablet 10/17/2023  Yes No   Sig: Take 1 tablet by

## 2023-10-17 NOTE — OP NOTE
[facility], [place]  [address] [place] [address], [address] [facility]      Colonoscopy Operative Report    Eliezer Medrano  [id number]  1950      Procedure Type:   Colonoscopy with polypectomy (hot biopsy)     Indications:    Screening colonoscopy       Pre-operative Diagnosis: see indication above    Post-operative Diagnosis:  See findings below    :  Kirk Hurtado MD    Surgical Assistant: Circulator: Ibrahima Frye RN  Endoscopy Technician: Awais Medina    Implants:  None    Referring Provider: Sabrina Issa DO      Sedation:  MAC anesthesia Propofol      Procedure Details:  After informed consent was obtained with all risks and benefits of procedure explained and preoperative exam completed, the patient was taken to the endoscopy suite and placed in the left lateral decubitus position. Upon sequential sedation as per above, a digital rectal exam was performed demonstrating internal hemorrhoids. The Olympus videocolonoscope  was inserted in the rectum and carefully advanced to the cecum, which was identified by the ileocecal valve and appendiceal orifice. The cecum was identified by the ileocecal valve and appendiceal orifice. The quality of preparation was good. The colonoscope was slowly withdrawn with careful evaluation between folds. Retroflexion in the rectum was completed . Findings:   Rectum: 3 sessile polyps around 1 cm in  size each, removed by hot biopsies      Sigmoid: moderate diverticulosis    Descending Colon: mild diverticulosis;  Transverse Colon: normal  Ascending Colon: 1 cm sessile polyp, removed by hot biopsies    Cecum: normal      Specimen Removed:   as above     Complications: None. EBL:  None. Impression:     see findings     Recommendations: --Await pathology.       Recommendation for next colonscopy in 3 versus 5  years  High fiber diet  Avoid NSAIDS for next 3 days   Signed By: Kirk Hurtado MD     10/17/2023  10:14 AM

## 2023-10-17 NOTE — ANESTHESIA PRE PROCEDURE
Department of Anesthesiology  Preprocedure Note       Name:  Jerry Goff   Age:  67 y.o.  :  1950                                          MRN:  [id number]         Date:  10/17/2023      Surgeon: Chanda Richard):  Tino Heimlich, MD    Procedure: Procedure(s):  COLONOSCOPY WITH BIOPSY    Medications prior to admission:   Prior to Admission medications    Medication Sig Start Date End Date Taking?  Authorizing Provider   Lancets MISC Use as directed Please test twice daily \"E11.9\" 22   Automatic Reconciliation, Ar   atorvastatin (LIPITOR) 10 MG tablet Take 1 tablet by mouth daily 22   Automatic Reconciliation, Ar   azelastine (ASTELIN) 0.1 % nasal spray 1 spray by Nasal route 2 times daily 20   Automatic Reconciliation, Ar   calcium carbonate (OS-MICHAEL) 1250 (500 Ca) MG chewable tablet Take 1 tablet by mouth daily  Patient not taking: Reported on 10/17/2023    Automatic Reconciliation, Ar   empagliflozin (JARDIANCE) 10 MG tablet Take 1 tablet by mouth daily 22   Automatic Reconciliation, Ar   esomeprazole (NEXIUM) 40 MG delayed release capsule Take 1 capsule by mouth daily 3/25/21   Automatic Reconciliation, Ar   glipiZIDE (GLUCOTROL) 5 MG tablet Take 1 tablet by mouth 2 times daily 22   Automatic Reconciliation, Ar   hydroCHLOROthiazide (HYDRODIURIL) 25 MG tablet Take 1 tablet by mouth daily 22   Automatic Reconciliation, Ar   ketoconazole (NIZORAL) 2 % shampoo APPLY EXTERNALLY AS DIRECTED TO AFFECTED AREA(S) OF FEET LEGS ARMS 2 TO 3 TIMES A WEEK 21   Automatic Reconciliation, Ar   losartan (COZAAR) 50 MG tablet Take 1 tablet by mouth daily 22   Automatic Reconciliation, Ar   selenium sulfide (SELSUN) 2.5 % lotion Apply thin layer to the skin daily for 7 days  Indications: a fungal infection of the skin called tinea versicolor 20   Automatic Reconciliation, Ar   sertraline (ZOLOFT) 50 MG tablet Take 1 tablet by mouth daily 22   Automatic Reconciliation, Ar

## 2023-11-22 ENCOUNTER — TRANSCRIBE ORDERS (OUTPATIENT)
Facility: HOSPITAL | Age: 73
End: 2023-11-22

## 2023-11-22 DIAGNOSIS — Z12.31 VISIT FOR SCREENING MAMMOGRAM: Primary | ICD-10-CM

## 2023-12-12 ENCOUNTER — HOSPITAL ENCOUNTER (OUTPATIENT)
Facility: HOSPITAL | Age: 73
Discharge: HOME | End: 2023-12-15
Payer: MEDICARE

## 2023-12-12 VITALS — WEIGHT: 170 LBS | HEIGHT: 63 IN | BODY MASS INDEX: 30.12 KG/M2

## 2023-12-12 DIAGNOSIS — Z12.31 VISIT FOR SCREENING MAMMOGRAM: ICD-10-CM

## 2023-12-12 PROCEDURE — 77067 SCR MAMMO BI INCL CAD: CPT

## 2024-11-25 ENCOUNTER — TRANSCRIBE ORDERS (OUTPATIENT)
Facility: HOSPITAL | Age: 74
End: 2024-11-25

## 2024-11-25 DIAGNOSIS — Z12.31 OTHER SCREENING MAMMOGRAM: Primary | ICD-10-CM

## 2025-01-09 ENCOUNTER — HOSPITAL ENCOUNTER (OUTPATIENT)
Facility: HOSPITAL | Age: 75
Discharge: HOME | End: 2025-01-12
Payer: MEDICARE

## 2025-01-09 VITALS — BODY MASS INDEX: 30.12 KG/M2 | WEIGHT: 170 LBS | HEIGHT: 63 IN

## 2025-01-09 DIAGNOSIS — Z12.31 OTHER SCREENING MAMMOGRAM: ICD-10-CM

## 2025-01-09 PROCEDURE — 77063 BREAST TOMOSYNTHESIS BI: CPT

## (undated) DEVICE — ELECTRODE PT RET AD L9FT HI MOIST COND ADH HYDRGEL CORDED

## (undated) DEVICE — FORCEPS BX L240CM JAW DIA2.2MM RAD JAW 4 HOT DISP